# Patient Record
Sex: MALE | Race: WHITE | NOT HISPANIC OR LATINO | Employment: OTHER | RURAL
[De-identification: names, ages, dates, MRNs, and addresses within clinical notes are randomized per-mention and may not be internally consistent; named-entity substitution may affect disease eponyms.]

---

## 2017-02-28 ENCOUNTER — CONVERSION ENCOUNTER (OUTPATIENT)
Dept: FAMILY MEDICINE CLINIC | Facility: CLINIC | Age: 42
End: 2017-02-28

## 2017-02-28 LAB
ALBUMIN SERPL-MCNC: 4.5 G/DL (ref 3.6–5.1)
ALBUMIN/GLOB SERPL: ABNORMAL {RATIO} (ref 1–2.5)
ALP SERPL-CCNC: 42 UNITS/L (ref 40–115)
ALT SERPL-CCNC: 69 UNITS/L (ref 9–46)
AST SERPL-CCNC: 28 UNITS/L (ref 10–40)
BASOPHILS # BLD AUTO: ABNORMAL 10*3/MM3 (ref 0–200)
BASOPHILS NFR BLD AUTO: 0.2 %
BILIRUB SERPL-MCNC: 0.4 MG/DL (ref 0.2–1.2)
BUN SERPL-MCNC: 14 MG/DL (ref 7–25)
BUN/CREAT SERPL: ABNORMAL (ref 6–22)
CALCIUM SERPL-MCNC: 10.1 MG/DL (ref 8.6–10.3)
CHLORIDE SERPL-SCNC: 104 MMOL/L (ref 98–110)
CO2 CONTENT VENOUS: 29 MMOL/L (ref 20–31)
CONV NEUTROPHILS/100 LEUKOCYTES IN BODY FLUID BY MANUAL COUNT: 59.7 %
CONV TOTAL PROTEIN: 7 G/DL (ref 6.1–8.1)
CREAT UR-MCNC: 0.9 MG/DL (ref 0.6–1.35)
EOSINOPHIL # BLD AUTO: 2 %
EOSINOPHIL # BLD AUTO: ABNORMAL 10*3/MM3 (ref 15–500)
ERYTHROCYTE [DISTWIDTH] IN BLOOD BY AUTOMATED COUNT: 15 % (ref 11–15)
GLOBULIN UR ELPH-MCNC: ABNORMAL G/DL (ref 1.9–3.7)
GLUCOSE SERPL-MCNC: 84 MG/DL (ref 65–99)
HCT VFR BLD AUTO: 55.2 % (ref 38.5–50)
HGB BLD-MCNC: 17.9 G/DL (ref 13.2–17.1)
LYMPHOCYTES # BLD AUTO: ABNORMAL 10*3/MM3 (ref 850–3900)
LYMPHOCYTES NFR BLD AUTO: 28.7 %
MCH RBC QN AUTO: 28.9 PG (ref 27–33)
MCHC RBC AUTO-ENTMCNC: ABNORMAL % (ref 32–36)
MCV RBC AUTO: 89.1 FL (ref 80–100)
MONOCYTES # BLD AUTO: ABNORMAL 10*3/MICROLITER (ref 200–950)
MONOCYTES NFR BLD AUTO: 9.4 %
NEUTROPHILS # BLD AUTO: ABNORMAL 10*3/MM3 (ref 1500–7800)
PLATELET # BLD AUTO: ABNORMAL 10*3/MM3 (ref 140–400)
PMV BLD AUTO: 7.5 FL (ref 7.5–12.5)
POTASSIUM SERPL-SCNC: 4.3 MMOL/L (ref 3.5–5.3)
RBC # BLD AUTO: ABNORMAL 10*6/MM3 (ref 4.2–5.8)
SODIUM SERPL-SCNC: 142 MMOL/L (ref 135–146)
TSH SERPL-ACNC: 1.3 MICROINTL UNITS/ML (ref 0.4–4.5)
WBC # BLD AUTO: ABNORMAL K/UL (ref 3.8–10.8)

## 2018-03-23 LAB
ALBUMIN SERPL-MCNC: 4.4 G/DL (ref 3.6–5.1)
ALBUMIN/GLOB SERPL: ABNORMAL {RATIO} (ref 1–2.5)
ALP SERPL-CCNC: 40 UNITS/L (ref 40–115)
ALT SERPL-CCNC: 42 UNITS/L (ref 9–46)
AST SERPL-CCNC: 22 UNITS/L (ref 10–40)
BASOPHILS # BLD AUTO: ABNORMAL 10*3/MM3 (ref 0–200)
BASOPHILS NFR BLD AUTO: 1 %
BILIRUB SERPL-MCNC: 0.6 MG/DL (ref 0.2–1.2)
BUN SERPL-MCNC: 15 MG/DL (ref 7–25)
BUN/CREAT SERPL: ABNORMAL (ref 6–22)
CALCIUM SERPL-MCNC: 9.9 MG/DL (ref 8.6–10.3)
CHLORIDE SERPL-SCNC: 102 MMOL/L (ref 98–110)
CHOLEST SERPL-MCNC: 262 MG/DL
CHOLEST/HDLC SERPL: ABNORMAL {RATIO}
CO2 CONTENT VENOUS: 32 MMOL/L (ref 20–31)
CONV NEUTROPHILS/100 LEUKOCYTES IN BODY FLUID BY MANUAL COUNT: 53.5 %
CONV TOTAL PROTEIN: 6.9 G/DL (ref 6.1–8.1)
CREAT UR-MCNC: 1 MG/DL (ref 0.6–1.35)
EOSINOPHIL # BLD AUTO: 2.6 %
EOSINOPHIL # BLD AUTO: ABNORMAL 10*3/MM3 (ref 15–500)
ERYTHROCYTE [DISTWIDTH] IN BLOOD BY AUTOMATED COUNT: 13 % (ref 11–15)
GLOBULIN UR ELPH-MCNC: ABNORMAL G/DL (ref 1.9–3.7)
GLUCOSE SERPL-MCNC: 96 MG/DL (ref 65–99)
HCT VFR BLD AUTO: 51.1 % (ref 38.5–50)
HDLC SERPL-MCNC: 47 MG/DL
HGB BLD-MCNC: 17.4 G/DL (ref 13.2–17.1)
LDLC SERPL CALC-MCNC: ABNORMAL MG/DL
LYMPHOCYTES # BLD AUTO: ABNORMAL 10*3/MM3 (ref 850–3900)
LYMPHOCYTES NFR BLD AUTO: 34.7 %
MCH RBC QN AUTO: 29.3 PG (ref 27–33)
MCHC RBC AUTO-ENTMCNC: ABNORMAL % (ref 32–36)
MCV RBC AUTO: 86.2 FL (ref 80–100)
MONOCYTES # BLD AUTO: ABNORMAL 10*3/MICROLITER (ref 200–950)
MONOCYTES NFR BLD AUTO: 8.2 %
NEUTROPHILS # BLD AUTO: ABNORMAL 10*3/MM3 (ref 1500–7800)
PLATELET # BLD AUTO: ABNORMAL 10*3/MM3 (ref 140–400)
PMV BLD AUTO: 9 FL (ref 7.5–12.5)
POTASSIUM SERPL-SCNC: 4.5 MMOL/L (ref 3.5–5.3)
RBC # BLD AUTO: ABNORMAL 10*6/MM3 (ref 4.2–5.8)
SODIUM SERPL-SCNC: 140 MMOL/L (ref 135–146)
TRIGL SERPL-MCNC: 168 MG/DL
WBC # BLD AUTO: ABNORMAL K/UL (ref 3.8–10.8)

## 2019-05-08 LAB
ALBUMIN SERPL-MCNC: 4.2 G/DL (ref 3.6–5.1)
ALBUMIN/GLOB SERPL: ABNORMAL {RATIO} (ref 1–2.5)
ALP SERPL-CCNC: 45 UNITS/L (ref 40–115)
ALT SERPL-CCNC: 77 UNITS/L (ref 9–46)
AST SERPL-CCNC: 45 UNITS/L (ref 10–40)
BILIRUB SERPL-MCNC: 0.7 MG/DL (ref 0.2–1.2)
BUN SERPL-MCNC: 14 MG/DL (ref 7–25)
BUN/CREAT SERPL: ABNORMAL (ref 6–22)
CALCIUM SERPL-MCNC: 9.4 MG/DL (ref 8.6–10.3)
CHLORIDE SERPL-SCNC: 103 MMOL/L (ref 98–110)
CHOLEST SERPL-MCNC: 170 MG/DL
CHOLEST/HDLC SERPL: ABNORMAL {RATIO}
CO2 CONTENT VENOUS: 27 MMOL/L (ref 20–32)
CONV TOTAL PROTEIN: 6.7 G/DL (ref 6.1–8.1)
CREAT UR-MCNC: 0.91 MG/DL (ref 0.6–1.35)
ERYTHROCYTE [DISTWIDTH] IN BLOOD BY AUTOMATED COUNT: 13.7 % (ref 11–15)
GLOBULIN UR ELPH-MCNC: ABNORMAL G/DL (ref 1.9–3.7)
GLUCOSE SERPL-MCNC: 102 MG/DL (ref 65–99)
HCT VFR BLD AUTO: 48.9 % (ref 38.5–50)
HDLC SERPL-MCNC: 52 MG/DL
HGB BLD-MCNC: 16.6 G/DL (ref 13.2–17.1)
LDLC SERPL CALC-MCNC: ABNORMAL MG/DL
MCH RBC QN AUTO: 29.5 PG (ref 27–33)
MCHC RBC AUTO-ENTMCNC: ABNORMAL % (ref 32–36)
MCV RBC AUTO: 87 FL (ref 80–100)
PLATELET # BLD AUTO: ABNORMAL 10*3/MM3 (ref 140–400)
PMV BLD AUTO: 8.6 FL (ref 7.5–12.5)
POTASSIUM SERPL-SCNC: 4 MMOL/L (ref 3.5–5.3)
RBC # BLD AUTO: ABNORMAL 10*6/MM3 (ref 4.2–5.8)
SODIUM SERPL-SCNC: 140 MMOL/L (ref 135–146)
TRIGL SERPL-MCNC: 119 MG/DL
WBC # BLD AUTO: ABNORMAL K/UL (ref 3.8–10.8)

## 2019-07-26 RX ORDER — ATORVASTATIN CALCIUM 40 MG/1
TABLET, FILM COATED ORAL
Qty: 90 TABLET | Refills: 1 | Status: SHIPPED | OUTPATIENT
Start: 2019-07-26 | End: 2020-06-03

## 2019-08-28 RX ORDER — LISINOPRIL AND HYDROCHLOROTHIAZIDE 20; 12.5 MG/1; MG/1
TABLET ORAL
Qty: 60 TABLET | Refills: 2 | Status: SHIPPED | OUTPATIENT
Start: 2019-08-28 | End: 2020-01-13

## 2019-10-06 ENCOUNTER — TELEPHONE (OUTPATIENT)
Dept: FAMILY MEDICINE CLINIC | Facility: CLINIC | Age: 44
End: 2019-10-06

## 2019-10-06 RX ORDER — ACYCLOVIR 400 MG/1
400 TABLET ORAL
Qty: 50 TABLET | Refills: 0 | Status: SHIPPED | OUTPATIENT
Start: 2019-10-06 | End: 2020-01-13

## 2019-10-06 RX ORDER — HYDROCODONE BITARTRATE AND ACETAMINOPHEN 5; 325 MG/1; MG/1
1 TABLET ORAL EVERY 6 HOURS PRN
Qty: 25 TABLET | Refills: 0 | Status: SHIPPED | OUTPATIENT
Start: 2019-10-06 | End: 2020-01-13

## 2019-10-06 NOTE — TELEPHONE ENCOUNTER
Patient called with painful rash on left side of forehead. Suspicious for shingles (photo sent). Meds sent to pharmacy. Instructions given.

## 2019-12-30 ENCOUNTER — OFFICE VISIT (OUTPATIENT)
Dept: FAMILY MEDICINE CLINIC | Facility: CLINIC | Age: 44
End: 2019-12-30

## 2019-12-30 DIAGNOSIS — J11.1 INFLUENZA: Primary | ICD-10-CM

## 2019-12-30 PROBLEM — E78.5 HYPERLIPIDEMIA: Status: ACTIVE | Noted: 2017-08-23

## 2019-12-30 PROBLEM — F98.8 ATTENTION DEFICIT DISORDER (ADD) WITHOUT HYPERACTIVITY: Status: ACTIVE | Noted: 2019-12-30

## 2019-12-30 PROCEDURE — 99213 OFFICE O/P EST LOW 20 MIN: CPT | Performed by: FAMILY MEDICINE

## 2019-12-30 RX ORDER — AMLODIPINE BESYLATE 10 MG/1
10 TABLET ORAL DAILY
COMMUNITY
Start: 2017-10-09 | End: 2020-03-24

## 2019-12-30 RX ORDER — FEXOFENADINE HYDROCHLORIDE 60 MG/1
TABLET, FILM COATED ORAL EVERY 24 HOURS
COMMUNITY
Start: 2019-05-09 | End: 2020-01-13

## 2019-12-30 RX ORDER — BUPROPION HYDROCHLORIDE 150 MG/1
TABLET, EXTENDED RELEASE ORAL EVERY 8 HOURS
COMMUNITY
Start: 2018-02-06 | End: 2020-01-13

## 2019-12-30 RX ORDER — TRAZODONE HYDROCHLORIDE 50 MG/1
TABLET ORAL
COMMUNITY
Start: 2019-12-27 | End: 2020-01-13

## 2019-12-30 RX ORDER — ASPIRIN 81 MG/1
81 TABLET ORAL DAILY
COMMUNITY
Start: 2019-05-09 | End: 2021-06-14 | Stop reason: SDUPTHER

## 2020-01-02 VITALS
TEMPERATURE: 98.7 F | RESPIRATION RATE: 16 BRPM | WEIGHT: 305 LBS | OXYGEN SATURATION: 94 % | DIASTOLIC BLOOD PRESSURE: 86 MMHG | SYSTOLIC BLOOD PRESSURE: 138 MMHG | HEIGHT: 71 IN | BODY MASS INDEX: 42.7 KG/M2 | HEART RATE: 88 BPM

## 2020-01-02 NOTE — ASSESSMENT & PLAN NOTE
Given the current community influenza outbreak, his classic symptoms, and timing I suspect he has influenza, most likely influenza A.  Discussed contagious nature and will provide prophylaxis for his spouse.  Cough medications as below. Increase fluids. Tylenol and Advil prn. Report worsening or persistence of symptoms. Discussed medications to help with symptoms of upper respiratory infection including cough suppressants, decongestants, anti-inflammatory medications, antihistamines, and antipyretics.  Patient was given a list of medications with appropriate dosages.

## 2020-01-13 ENCOUNTER — OFFICE VISIT (OUTPATIENT)
Dept: FAMILY MEDICINE CLINIC | Facility: CLINIC | Age: 45
End: 2020-01-13

## 2020-01-13 ENCOUNTER — HOSPITAL ENCOUNTER (OUTPATIENT)
Facility: HOSPITAL | Age: 45
Discharge: HOME | End: 2020-01-14
Attending: INTERNAL MEDICINE | Admitting: INTERNAL MEDICINE

## 2020-01-13 ENCOUNTER — APPOINTMENT (OUTPATIENT)
Dept: GENERAL RADIOLOGY | Facility: HOSPITAL | Age: 45
End: 2020-01-13

## 2020-01-13 VITALS
RESPIRATION RATE: 18 BRPM | HEART RATE: 86 BPM | DIASTOLIC BLOOD PRESSURE: 75 MMHG | TEMPERATURE: 97.9 F | OXYGEN SATURATION: 95 % | BODY MASS INDEX: 43.26 KG/M2 | HEIGHT: 71 IN | SYSTOLIC BLOOD PRESSURE: 121 MMHG | WEIGHT: 309 LBS

## 2020-01-13 DIAGNOSIS — I47.20 VT (VENTRICULAR TACHYCARDIA) (HCC): ICD-10-CM

## 2020-01-13 DIAGNOSIS — R07.9 CHEST PAIN, UNSPECIFIED TYPE: Primary | ICD-10-CM

## 2020-01-13 PROBLEM — J11.1 INFLUENZA: Status: RESOLVED | Noted: 2019-12-30 | Resolved: 2020-01-13

## 2020-01-13 PROBLEM — I10 HYPERTENSION: Status: ACTIVE | Noted: 2020-01-13

## 2020-01-13 LAB
ALBUMIN SERPL-MCNC: 4 G/DL (ref 3.5–5.2)
ALBUMIN/GLOB SERPL: 1.3 G/DL
ALP SERPL-CCNC: 45 U/L (ref 39–117)
ALT SERPL W P-5'-P-CCNC: 77 U/L (ref 1–41)
ANION GAP SERPL CALCULATED.3IONS-SCNC: 12 MMOL/L (ref 5–15)
AST SERPL-CCNC: 43 U/L (ref 1–40)
BASOPHILS # BLD AUTO: 0 10*3/MM3 (ref 0–0.2)
BASOPHILS NFR BLD AUTO: 0.2 % (ref 0–1.5)
BILIRUB SERPL-MCNC: 0.3 MG/DL (ref 0.2–1.2)
BUN BLD-MCNC: 14 MG/DL (ref 6–20)
BUN/CREAT SERPL: 17.1 (ref 7–25)
CALCIUM SPEC-SCNC: 9.5 MG/DL (ref 8.6–10.5)
CHLORIDE SERPL-SCNC: 104 MMOL/L (ref 98–107)
CO2 SERPL-SCNC: 23 MMOL/L (ref 22–29)
CREAT BLD-MCNC: 0.82 MG/DL (ref 0.76–1.27)
DEPRECATED RDW RBC AUTO: 45.5 FL (ref 37–54)
EOSINOPHIL # BLD AUTO: 0.3 10*3/MM3 (ref 0–0.4)
EOSINOPHIL NFR BLD AUTO: 3.3 % (ref 0.3–6.2)
ERYTHROCYTE [DISTWIDTH] IN BLOOD BY AUTOMATED COUNT: 14.4 % (ref 12.3–15.4)
GFR SERPL CREATININE-BSD FRML MDRD: 102 ML/MIN/1.73
GLOBULIN UR ELPH-MCNC: 3.2 GM/DL
GLUCOSE BLD-MCNC: 129 MG/DL (ref 65–99)
HCT VFR BLD AUTO: 50.2 % (ref 37.5–51)
HGB BLD-MCNC: 16.9 G/DL (ref 13–17.7)
HOLD SPECIMEN: NORMAL
HOLD SPECIMEN: NORMAL
LYMPHOCYTES # BLD AUTO: 2.2 10*3/MM3 (ref 0.7–3.1)
LYMPHOCYTES NFR BLD AUTO: 24.6 % (ref 19.6–45.3)
MCH RBC QN AUTO: 30.4 PG (ref 26.6–33)
MCHC RBC AUTO-ENTMCNC: 33.6 G/DL (ref 31.5–35.7)
MCV RBC AUTO: 90.4 FL (ref 79–97)
MONOCYTES # BLD AUTO: 0.8 10*3/MM3 (ref 0.1–0.9)
MONOCYTES NFR BLD AUTO: 8.9 % (ref 5–12)
NEUTROPHILS # BLD AUTO: 5.8 10*3/MM3 (ref 1.7–7)
NEUTROPHILS NFR BLD AUTO: 63 % (ref 42.7–76)
NRBC BLD AUTO-RTO: 0.1 /100 WBC (ref 0–0.2)
PLATELET # BLD AUTO: 295 10*3/MM3 (ref 140–450)
PMV BLD AUTO: 6.7 FL (ref 6–12)
POTASSIUM BLD-SCNC: 3.9 MMOL/L (ref 3.5–5.2)
PROT SERPL-MCNC: 7.2 G/DL (ref 6–8.5)
RBC # BLD AUTO: 5.55 10*6/MM3 (ref 4.14–5.8)
SODIUM BLD-SCNC: 139 MMOL/L (ref 136–145)
TROPONIN T SERPL-MCNC: <0.01 NG/ML (ref 0–0.03)
WBC NRBC COR # BLD: 9.1 10*3/MM3 (ref 3.4–10.8)
WHOLE BLOOD HOLD SPECIMEN: NORMAL
WHOLE BLOOD HOLD SPECIMEN: NORMAL

## 2020-01-13 PROCEDURE — G0378 HOSPITAL OBSERVATION PER HR: HCPCS

## 2020-01-13 PROCEDURE — 85025 COMPLETE CBC W/AUTO DIFF WBC: CPT | Performed by: NURSE PRACTITIONER

## 2020-01-13 PROCEDURE — 99213 OFFICE O/P EST LOW 20 MIN: CPT | Performed by: FAMILY MEDICINE

## 2020-01-13 PROCEDURE — 36415 COLL VENOUS BLD VENIPUNCTURE: CPT

## 2020-01-13 PROCEDURE — 99284 EMERGENCY DEPT VISIT MOD MDM: CPT

## 2020-01-13 PROCEDURE — 93005 ELECTROCARDIOGRAM TRACING: CPT | Performed by: INTERNAL MEDICINE

## 2020-01-13 PROCEDURE — 80053 COMPREHEN METABOLIC PANEL: CPT | Performed by: NURSE PRACTITIONER

## 2020-01-13 PROCEDURE — 93000 ELECTROCARDIOGRAM COMPLETE: CPT | Performed by: FAMILY MEDICINE

## 2020-01-13 PROCEDURE — 93005 ELECTROCARDIOGRAM TRACING: CPT

## 2020-01-13 PROCEDURE — 71045 X-RAY EXAM CHEST 1 VIEW: CPT

## 2020-01-13 PROCEDURE — 84484 ASSAY OF TROPONIN QUANT: CPT | Performed by: NURSE PRACTITIONER

## 2020-01-13 PROCEDURE — 99219 PR INITIAL OBSERVATION CARE/DAY 50 MINUTES: CPT | Performed by: PHYSICIAN ASSISTANT

## 2020-01-13 RX ORDER — NITROGLYCERIN 0.4 MG/1
0.4 TABLET SUBLINGUAL
Status: DISCONTINUED | OUTPATIENT
Start: 2020-01-13 | End: 2020-01-14 | Stop reason: HOSPADM

## 2020-01-13 RX ORDER — ONDANSETRON 2 MG/ML
4 INJECTION INTRAMUSCULAR; INTRAVENOUS EVERY 6 HOURS PRN
Status: DISCONTINUED | OUTPATIENT
Start: 2020-01-13 | End: 2020-01-14 | Stop reason: HOSPADM

## 2020-01-13 RX ORDER — KETOROLAC TROMETHAMINE 15 MG/ML
15 INJECTION, SOLUTION INTRAMUSCULAR; INTRAVENOUS EVERY 6 HOURS PRN
Status: DISCONTINUED | OUTPATIENT
Start: 2020-01-13 | End: 2020-01-14 | Stop reason: HOSPADM

## 2020-01-13 RX ORDER — ASPIRIN 81 MG/1
324 TABLET, CHEWABLE ORAL ONCE
Status: COMPLETED | OUTPATIENT
Start: 2020-01-13 | End: 2020-01-13

## 2020-01-13 RX ORDER — LISINOPRIL AND HYDROCHLOROTHIAZIDE 20; 12.5 MG/1; MG/1
2 TABLET ORAL DAILY
COMMUNITY
End: 2020-02-04

## 2020-01-13 RX ORDER — HYDRALAZINE HYDROCHLORIDE 20 MG/ML
10 INJECTION INTRAMUSCULAR; INTRAVENOUS EVERY 6 HOURS PRN
Status: DISCONTINUED | OUTPATIENT
Start: 2020-01-13 | End: 2020-01-14 | Stop reason: HOSPADM

## 2020-01-13 RX ORDER — POTASSIUM CHLORIDE 20 MEQ/1
40 TABLET, EXTENDED RELEASE ORAL AS NEEDED
Status: DISCONTINUED | OUTPATIENT
Start: 2020-01-13 | End: 2020-01-14 | Stop reason: HOSPADM

## 2020-01-13 RX ORDER — CHOLECALCIFEROL (VITAMIN D3) 125 MCG
5 CAPSULE ORAL NIGHTLY PRN
Status: DISCONTINUED | OUTPATIENT
Start: 2020-01-13 | End: 2020-01-14 | Stop reason: HOSPADM

## 2020-01-13 RX ORDER — BISACODYL 10 MG
10 SUPPOSITORY, RECTAL RECTAL DAILY PRN
Status: DISCONTINUED | OUTPATIENT
Start: 2020-01-13 | End: 2020-01-14 | Stop reason: HOSPADM

## 2020-01-13 RX ORDER — SODIUM CHLORIDE 0.9 % (FLUSH) 0.9 %
10 SYRINGE (ML) INJECTION AS NEEDED
Status: DISCONTINUED | OUTPATIENT
Start: 2020-01-13 | End: 2020-01-14 | Stop reason: HOSPADM

## 2020-01-13 RX ORDER — POTASSIUM CHLORIDE 1.5 G/1.77G
40 POWDER, FOR SOLUTION ORAL AS NEEDED
Status: DISCONTINUED | OUTPATIENT
Start: 2020-01-13 | End: 2020-01-14 | Stop reason: HOSPADM

## 2020-01-13 RX ORDER — ALUMINA, MAGNESIA, AND SIMETHICONE 2400; 2400; 240 MG/30ML; MG/30ML; MG/30ML
15 SUSPENSION ORAL EVERY 6 HOURS PRN
Status: DISCONTINUED | OUTPATIENT
Start: 2020-01-13 | End: 2020-01-14 | Stop reason: HOSPADM

## 2020-01-13 RX ORDER — CALCIUM CARBONATE 200(500)MG
2 TABLET,CHEWABLE ORAL 2 TIMES DAILY PRN
Status: DISCONTINUED | OUTPATIENT
Start: 2020-01-13 | End: 2020-01-14 | Stop reason: HOSPADM

## 2020-01-13 RX ORDER — ATORVASTATIN CALCIUM 40 MG/1
40 TABLET, FILM COATED ORAL NIGHTLY
Status: DISCONTINUED | OUTPATIENT
Start: 2020-01-13 | End: 2020-01-14 | Stop reason: HOSPADM

## 2020-01-13 RX ORDER — ACETAMINOPHEN 325 MG/1
650 TABLET ORAL EVERY 4 HOURS PRN
Status: DISCONTINUED | OUTPATIENT
Start: 2020-01-13 | End: 2020-01-14 | Stop reason: HOSPADM

## 2020-01-13 RX ORDER — MAGNESIUM SULFATE HEPTAHYDRATE 40 MG/ML
2 INJECTION, SOLUTION INTRAVENOUS AS NEEDED
Status: DISCONTINUED | OUTPATIENT
Start: 2020-01-13 | End: 2020-01-14 | Stop reason: HOSPADM

## 2020-01-13 RX ORDER — SODIUM CHLORIDE 0.9 % (FLUSH) 0.9 %
10 SYRINGE (ML) INJECTION EVERY 12 HOURS SCHEDULED
Status: DISCONTINUED | OUTPATIENT
Start: 2020-01-13 | End: 2020-01-14 | Stop reason: HOSPADM

## 2020-01-13 RX ORDER — DOCUSATE SODIUM 100 MG/1
100 CAPSULE, LIQUID FILLED ORAL 2 TIMES DAILY PRN
Status: DISCONTINUED | OUTPATIENT
Start: 2020-01-13 | End: 2020-01-14 | Stop reason: HOSPADM

## 2020-01-13 RX ORDER — ASPIRIN 81 MG/1
81 TABLET ORAL DAILY
Status: DISCONTINUED | OUTPATIENT
Start: 2020-01-14 | End: 2020-01-14 | Stop reason: HOSPADM

## 2020-01-13 RX ORDER — MAGNESIUM SULFATE HEPTAHYDRATE 40 MG/ML
4 INJECTION, SOLUTION INTRAVENOUS AS NEEDED
Status: DISCONTINUED | OUTPATIENT
Start: 2020-01-13 | End: 2020-01-14 | Stop reason: HOSPADM

## 2020-01-13 RX ORDER — ACETAMINOPHEN 650 MG/1
650 SUPPOSITORY RECTAL EVERY 4 HOURS PRN
Status: DISCONTINUED | OUTPATIENT
Start: 2020-01-13 | End: 2020-01-14 | Stop reason: HOSPADM

## 2020-01-13 RX ORDER — ONDANSETRON 4 MG/1
4 TABLET, FILM COATED ORAL EVERY 6 HOURS PRN
Status: DISCONTINUED | OUTPATIENT
Start: 2020-01-13 | End: 2020-01-14 | Stop reason: HOSPADM

## 2020-01-13 RX ORDER — HYDRALAZINE HYDROCHLORIDE 10 MG/1
10 TABLET, FILM COATED ORAL ONCE
Status: DISCONTINUED | OUTPATIENT
Start: 2020-01-13 | End: 2020-01-13

## 2020-01-13 RX ORDER — ACETAMINOPHEN 160 MG/5ML
650 SOLUTION ORAL EVERY 4 HOURS PRN
Status: DISCONTINUED | OUTPATIENT
Start: 2020-01-13 | End: 2020-01-14 | Stop reason: HOSPADM

## 2020-01-13 RX ADMIN — ATORVASTATIN CALCIUM 40 MG: 40 TABLET, FILM COATED ORAL at 22:16

## 2020-01-13 RX ADMIN — Medication 10 ML: at 22:17

## 2020-01-13 RX ADMIN — ACETAMINOPHEN 650 MG: 325 TABLET, FILM COATED ORAL at 23:28

## 2020-01-13 RX ADMIN — ASPIRIN 81 MG 324 MG: 81 TABLET ORAL at 18:11

## 2020-01-13 RX ADMIN — NITROGLYCERIN 1 INCH: 20 OINTMENT TOPICAL at 19:18

## 2020-01-13 NOTE — ASSESSMENT & PLAN NOTE
EKG shows some nonspecific changes with some T wave inversion in inferior lateral leads and possible ST depression.  Could be subacute coronary syndrome but also could be myocarditis.  Patient's advised to go to the emergency room for further work-up including blood work and monitoring.  Discussed with emergency room.  EKG scanned into chart.

## 2020-01-13 NOTE — PROGRESS NOTES
Patient presents with a 4-day history of pleuritic type chest pain that is sharp and associated with movement.  He had recent upper respiratory infection from influenza virus.  He has not had any change in exercise tolerance.  Risk factors include hypertension, hyperlipidemia, and obesity.  He had contacted me over the weekend and been advised to go the emergency room for pain but chose to come to the office today.  He denies sputum production but has had a nonproductive cough for several weeks.    Past Medical History:   Diagnosis Date   • ADD (attention deficit disorder)    • Allergic rhinitis    • Anserine bursitis l   • Bilateral knee pain    • Borderline hyperlipidemia    • BPH (benign prostatic hyperplasia)    • Condition not found    • Depression    • Fatigue    • History of tobacco use    • Hypertension    • Left shoulder strain    • Malaise and fatigue    • Obesity    • Sore throat      Past Surgical History:   Procedure Laterality Date   • ANKLE ARTHROSCOPY Right 11/2008   • TONSILLECTOMY       Family History   Problem Relation Age of Onset   • Diabetes Father    • Hypertension Father    • Hyperlipidemia Father    • Other Father         lung/respiratory disease    • Stroke Father    • Diabetes Maternal Grandmother    • Lung cancer Maternal Grandfather    • Heart disease Paternal Grandfather      Social History     Tobacco Use   • Smoking status: Never Smoker   • Smokeless tobacco: Never Used   Substance Use Topics   • Alcohol use: Not on file     Current Outpatient Medications on File Prior to Visit   Medication Sig Dispense Refill   • acyclovir (ZOVIRAX) 400 MG tablet Take 1 tablet by mouth 5 (Five) Times a Day. Take no more than 5 doses a day. 50 tablet 0   • amLODIPine (NORVASC) 10 MG tablet Daily.     • aspirin (ADULT ASPIRIN REGIMEN) 81 MG EC tablet Daily.     • atorvastatin (LIPITOR) 40 MG tablet TAKE ONE TABLET BY MOUTH ONCE DAILY 90 tablet 1   • buPROPion SR (WELLBUTRIN SR) 150 MG 12 hr tablet  "Every 8 (Eight) Hours.     • fexofenadine (ALLEGRA ALLERGY) 60 MG tablet Daily.     • lisinopril-hydrochlorothiazide (PRINZIDE,ZESTORETIC) 20-12.5 MG per tablet TAKE ONE TABLET BY MOUTH TWO TIMES A DAY 60 tablet 2   • traZODone (DESYREL) 50 MG tablet      • HYDROcodone-acetaminophen (NORCO) 5-325 MG per tablet Take 1 tablet by mouth Every 6 (Six) Hours As Needed for Severe Pain . 25 tablet 0   • HYDROcodone-homatropine (HYCODAN) 5-1.5 MG/5ML syrup Take 5 mL by mouth Every 6 (Six) Hours As Needed for Cough. 240 mL 0     No current facility-administered medications on file prior to visit.       PHQ-2 Depression Screening  Little interest or pleasure in doing things? 0   Feeling down, depressed, or hopeless? 0   PHQ-2 Total Score 0     Review of Systems   Constitutional: Negative for chills and fatigue.   Respiratory: Positive for cough. Negative for shortness of breath.    Cardiovascular: Positive for chest pain. Negative for palpitations.   Gastrointestinal: Negative for nausea.   Musculoskeletal: Negative for arthralgias, back pain and myalgias.   Skin: Negative for rash.   Neurological: Negative for dizziness and headache.     Vitals:    01/13/20 1649   BP: 121/75   BP Location: Left arm   Patient Position: Sitting   Cuff Size: Adult   Pulse: 86   Resp: 18   Temp: 97.9 °F (36.6 °C)   TempSrc: Oral   SpO2: 95%   Weight: (!) 140 kg (309 lb)   Height: 180.3 cm (71\")     Body mass index is 43.1 kg/m².    Physical Exam   Constitutional: He is oriented to person, place, and time. He appears well-developed and well-nourished. No distress.   Cardiovascular: Normal rate, regular rhythm and normal heart sounds.   No murmur heard.  Pulmonary/Chest: Effort normal and breath sounds normal. He has no wheezes. He has no rales.   Abdominal: Soft. Bowel sounds are normal. He exhibits no distension.   Musculoskeletal: Normal range of motion.   Neurological: He is alert and oriented to person, place, and time.   Skin: Skin is warm " and dry.   Psychiatric: He has a normal mood and affect. His behavior is normal.           Diagnoses and all orders for this visit:    1. Chest pain, unspecified type (Primary)  Assessment & Plan:  EKG shows some nonspecific changes with some T wave inversion in inferior lateral leads and possible ST depression.  Could be subacute coronary syndrome but also could be myocarditis.  Patient's advised to go to the emergency room for further work-up including blood work and monitoring.  Discussed with emergency room.  EKG scanned into chart.    Orders:  -     ECG 12 Lead  SCANNED EKG  Date/Time: 1/13/2020 5:09 PM  Performed by: Lyell, Reggie Duane, MD  Authorized by: Lyell, Reggie Duane, MD       ECG 12 Lead  Date/Time: 1/13/2020 5:10 PM  Performed by: Lyell, Reggie Duane, MD  Authorized by: Lyell, Reggie Duane, MD   Comparison: not compared with previous ECG   Previous ECG: no previous ECG available  Rhythm: sinus rhythm  Rate: normal  Conduction: conduction normal  ST Depression: II, III, aVF, V4, V5 and V6  T inversion: II, III, aVF, V4, V5, V6 and V3  T flattening: aVR and I  QRS axis: normal  Other findings: non-specific ST-T wave changes    Clinical impression: abnormal EKG

## 2020-01-14 ENCOUNTER — APPOINTMENT (OUTPATIENT)
Dept: NUCLEAR MEDICINE | Facility: HOSPITAL | Age: 45
End: 2020-01-14

## 2020-01-14 ENCOUNTER — APPOINTMENT (OUTPATIENT)
Dept: RESPIRATORY THERAPY | Facility: HOSPITAL | Age: 45
End: 2020-01-14

## 2020-01-14 VITALS
RESPIRATION RATE: 20 BRPM | OXYGEN SATURATION: 93 % | DIASTOLIC BLOOD PRESSURE: 83 MMHG | HEART RATE: 95 BPM | SYSTOLIC BLOOD PRESSURE: 138 MMHG | WEIGHT: 300 LBS | HEIGHT: 71 IN | TEMPERATURE: 99 F | BODY MASS INDEX: 42 KG/M2

## 2020-01-14 PROBLEM — I47.20 VT (VENTRICULAR TACHYCARDIA) (HCC): Status: ACTIVE | Noted: 2020-01-13

## 2020-01-14 LAB
ANION GAP SERPL CALCULATED.3IONS-SCNC: 9 MMOL/L (ref 5–15)
BASOPHILS # BLD AUTO: 0.1 10*3/MM3 (ref 0–0.2)
BASOPHILS NFR BLD AUTO: 1.2 % (ref 0–1.5)
BH CV NUCLEAR PRIOR STUDY: 3
BH CV STRESS BP STAGE 1: NORMAL
BH CV STRESS BP STAGE 2: NORMAL
BH CV STRESS BP STAGE 3: NORMAL
BH CV STRESS DURATION MIN STAGE 1: 3
BH CV STRESS DURATION MIN STAGE 2: 3
BH CV STRESS DURATION MIN STAGE 3: 3
BH CV STRESS DURATION SEC STAGE 1: 0
BH CV STRESS DURATION SEC STAGE 2: 0
BH CV STRESS DURATION SEC STAGE 3: 0
BH CV STRESS GRADE STAGE 1: 10
BH CV STRESS GRADE STAGE 2: 12
BH CV STRESS GRADE STAGE 3: 14
BH CV STRESS HR STAGE 1: 114
BH CV STRESS HR STAGE 2: 139
BH CV STRESS HR STAGE 3: 152
BH CV STRESS METS STAGE 1: 5
BH CV STRESS METS STAGE 2: 7.5
BH CV STRESS METS STAGE 3: 10
BH CV STRESS PROTOCOL 1: NORMAL
BH CV STRESS RECOVERY BP: NORMAL MMHG
BH CV STRESS RECOVERY HR: 104 BPM
BH CV STRESS SPEED STAGE 1: 1.7
BH CV STRESS SPEED STAGE 2: 2.5
BH CV STRESS SPEED STAGE 3: 3.4
BH CV STRESS STAGE 1: 1
BH CV STRESS STAGE 2: 2
BH CV STRESS STAGE 3: 3
BUN BLD-MCNC: 17 MG/DL (ref 6–20)
BUN/CREAT SERPL: 19.1 (ref 7–25)
CALCIUM SPEC-SCNC: 9.5 MG/DL (ref 8.6–10.5)
CHLORIDE SERPL-SCNC: 103 MMOL/L (ref 98–107)
CO2 SERPL-SCNC: 26 MMOL/L (ref 22–29)
CREAT BLD-MCNC: 0.89 MG/DL (ref 0.76–1.27)
DEPRECATED RDW RBC AUTO: 44.6 FL (ref 37–54)
EOSINOPHIL # BLD AUTO: 0.3 10*3/MM3 (ref 0–0.4)
EOSINOPHIL NFR BLD AUTO: 3.5 % (ref 0.3–6.2)
ERYTHROCYTE [DISTWIDTH] IN BLOOD BY AUTOMATED COUNT: 14.1 % (ref 12.3–15.4)
GFR SERPL CREATININE-BSD FRML MDRD: 93 ML/MIN/1.73
GLUCOSE BLD-MCNC: 97 MG/DL (ref 65–99)
HCT VFR BLD AUTO: 46.5 % (ref 37.5–51)
HGB BLD-MCNC: 15.5 G/DL (ref 13–17.7)
LV EF NUC BP: 58 %
LYMPHOCYTES # BLD AUTO: 2.7 10*3/MM3 (ref 0.7–3.1)
LYMPHOCYTES NFR BLD AUTO: 30 % (ref 19.6–45.3)
MAXIMAL PREDICTED HEART RATE: 176 BPM
MCH RBC QN AUTO: 30.1 PG (ref 26.6–33)
MCHC RBC AUTO-ENTMCNC: 33.4 G/DL (ref 31.5–35.7)
MCV RBC AUTO: 90 FL (ref 79–97)
MONOCYTES # BLD AUTO: 0.9 10*3/MM3 (ref 0.1–0.9)
MONOCYTES NFR BLD AUTO: 10 % (ref 5–12)
NEUTROPHILS # BLD AUTO: 4.9 10*3/MM3 (ref 1.7–7)
NEUTROPHILS NFR BLD AUTO: 55.3 % (ref 42.7–76)
NRBC BLD AUTO-RTO: 0.1 /100 WBC (ref 0–0.2)
PERCENT MAX PREDICTED HR: 86.36 %
PLATELET # BLD AUTO: 267 10*3/MM3 (ref 140–450)
PMV BLD AUTO: 6.9 FL (ref 6–12)
POTASSIUM BLD-SCNC: 4.3 MMOL/L (ref 3.5–5.2)
RBC # BLD AUTO: 5.17 10*6/MM3 (ref 4.14–5.8)
SODIUM BLD-SCNC: 138 MMOL/L (ref 136–145)
STRESS BASELINE BP: NORMAL MMHG
STRESS BASELINE HR: 95 BPM
STRESS PERCENT HR: 102 %
STRESS POST ESTIMATED WORKLOAD: 10.1 METS
STRESS POST EXERCISE DUR MIN: 7 MIN
STRESS POST EXERCISE DUR SEC: 14 SEC
STRESS POST PEAK BP: NORMAL MMHG
STRESS POST PEAK HR: 152 BPM
STRESS TARGET HR: 150 BPM
TROPONIN T SERPL-MCNC: <0.01 NG/ML (ref 0–0.03)
TROPONIN T SERPL-MCNC: <0.01 NG/ML (ref 0–0.03)
WBC NRBC COR # BLD: 8.8 10*3/MM3 (ref 3.4–10.8)

## 2020-01-14 PROCEDURE — A9500 TC99M SESTAMIBI: HCPCS | Performed by: INTERNAL MEDICINE

## 2020-01-14 PROCEDURE — 84484 ASSAY OF TROPONIN QUANT: CPT | Performed by: PHYSICIAN ASSISTANT

## 2020-01-14 PROCEDURE — G0378 HOSPITAL OBSERVATION PER HR: HCPCS

## 2020-01-14 PROCEDURE — 99245 OFF/OP CONSLTJ NEW/EST HI 55: CPT | Performed by: INTERNAL MEDICINE

## 2020-01-14 PROCEDURE — 78452 HT MUSCLE IMAGE SPECT MULT: CPT

## 2020-01-14 PROCEDURE — 85025 COMPLETE CBC W/AUTO DIFF WBC: CPT | Performed by: PHYSICIAN ASSISTANT

## 2020-01-14 PROCEDURE — 0 TECHNETIUM SESTAMIBI: Performed by: INTERNAL MEDICINE

## 2020-01-14 PROCEDURE — C1894 INTRO/SHEATH, NON-LASER: HCPCS | Performed by: INTERNAL MEDICINE

## 2020-01-14 PROCEDURE — 99217 PR OBSERVATION CARE DISCHARGE MANAGEMENT: CPT | Performed by: INTERNAL MEDICINE

## 2020-01-14 PROCEDURE — 93458 L HRT ARTERY/VENTRICLE ANGIO: CPT | Performed by: INTERNAL MEDICINE

## 2020-01-14 PROCEDURE — 93016 CV STRESS TEST SUPVJ ONLY: CPT | Performed by: NURSE PRACTITIONER

## 2020-01-14 PROCEDURE — C1769 GUIDE WIRE: HCPCS | Performed by: INTERNAL MEDICINE

## 2020-01-14 PROCEDURE — 78452 HT MUSCLE IMAGE SPECT MULT: CPT | Performed by: INTERNAL MEDICINE

## 2020-01-14 PROCEDURE — 0 IOPAMIDOL PER 1 ML: Performed by: INTERNAL MEDICINE

## 2020-01-14 PROCEDURE — 80048 BASIC METABOLIC PNL TOTAL CA: CPT | Performed by: PHYSICIAN ASSISTANT

## 2020-01-14 PROCEDURE — 25010000002 FENTANYL CITRATE (PF) 100 MCG/2ML SOLUTION: Performed by: INTERNAL MEDICINE

## 2020-01-14 PROCEDURE — 93270 REMOTE 30 DAY ECG REV/REPORT: CPT

## 2020-01-14 PROCEDURE — 93017 CV STRESS TEST TRACING ONLY: CPT

## 2020-01-14 PROCEDURE — 93018 CV STRESS TEST I&R ONLY: CPT | Performed by: INTERNAL MEDICINE

## 2020-01-14 PROCEDURE — 25010000002 MIDAZOLAM PER 1 MG: Performed by: INTERNAL MEDICINE

## 2020-01-14 RX ORDER — SODIUM CHLORIDE 9 MG/ML
250 INJECTION, SOLUTION INTRAVENOUS ONCE AS NEEDED
Status: DISCONTINUED | OUTPATIENT
Start: 2020-01-14 | End: 2020-01-14 | Stop reason: HOSPADM

## 2020-01-14 RX ORDER — DIPHENHYDRAMINE HCL 25 MG
25 TABLET ORAL EVERY 6 HOURS PRN
Status: DISCONTINUED | OUTPATIENT
Start: 2020-01-14 | End: 2020-01-14 | Stop reason: HOSPADM

## 2020-01-14 RX ORDER — FENTANYL CITRATE 50 UG/ML
INJECTION, SOLUTION INTRAMUSCULAR; INTRAVENOUS AS NEEDED
Status: DISCONTINUED | OUTPATIENT
Start: 2020-01-14 | End: 2020-01-14 | Stop reason: HOSPADM

## 2020-01-14 RX ORDER — ALUMINA, MAGNESIA, AND SIMETHICONE 2400; 2400; 240 MG/30ML; MG/30ML; MG/30ML
15 SUSPENSION ORAL EVERY 6 HOURS PRN
Status: DISCONTINUED | OUTPATIENT
Start: 2020-01-14 | End: 2020-01-14 | Stop reason: SDUPTHER

## 2020-01-14 RX ORDER — SODIUM CHLORIDE 9 MG/ML
30 INJECTION, SOLUTION INTRAVENOUS CONTINUOUS
Status: DISCONTINUED | OUTPATIENT
Start: 2020-01-14 | End: 2020-01-14

## 2020-01-14 RX ORDER — ONDANSETRON 4 MG/1
4 TABLET, FILM COATED ORAL EVERY 6 HOURS PRN
Status: DISCONTINUED | OUTPATIENT
Start: 2020-01-14 | End: 2020-01-14 | Stop reason: SDUPTHER

## 2020-01-14 RX ORDER — AMOXICILLIN 250 MG
2 CAPSULE ORAL NIGHTLY
Status: DISCONTINUED | OUTPATIENT
Start: 2020-01-14 | End: 2020-01-14 | Stop reason: HOSPADM

## 2020-01-14 RX ORDER — CLOPIDOGREL BISULFATE 75 MG/1
75 TABLET ORAL DAILY
Qty: 30 TABLET | Refills: 0 | Status: SHIPPED | OUTPATIENT
Start: 2020-01-14 | End: 2020-02-18 | Stop reason: SDUPTHER

## 2020-01-14 RX ORDER — ASPIRIN 81 MG/1
81 TABLET ORAL DAILY
Status: DISCONTINUED | OUTPATIENT
Start: 2020-01-14 | End: 2020-01-14 | Stop reason: SDUPTHER

## 2020-01-14 RX ORDER — MIDAZOLAM HYDROCHLORIDE 1 MG/ML
INJECTION INTRAMUSCULAR; INTRAVENOUS AS NEEDED
Status: DISCONTINUED | OUTPATIENT
Start: 2020-01-14 | End: 2020-01-14 | Stop reason: HOSPADM

## 2020-01-14 RX ORDER — ONDANSETRON 2 MG/ML
4 INJECTION INTRAMUSCULAR; INTRAVENOUS EVERY 6 HOURS PRN
Status: DISCONTINUED | OUTPATIENT
Start: 2020-01-14 | End: 2020-01-14 | Stop reason: SDUPTHER

## 2020-01-14 RX ORDER — CLOPIDOGREL BISULFATE 75 MG/1
75 TABLET ORAL DAILY
Status: DISCONTINUED | OUTPATIENT
Start: 2020-01-14 | End: 2020-01-14 | Stop reason: HOSPADM

## 2020-01-14 RX ORDER — ATROPINE SULFATE 1 MG/ML
.5-1 INJECTION, SOLUTION INTRAMUSCULAR; INTRAVENOUS; SUBCUTANEOUS
Status: DISCONTINUED | OUTPATIENT
Start: 2020-01-14 | End: 2020-01-14 | Stop reason: HOSPADM

## 2020-01-14 RX ORDER — ACETAMINOPHEN 325 MG/1
650 TABLET ORAL EVERY 4 HOURS PRN
Status: DISCONTINUED | OUTPATIENT
Start: 2020-01-14 | End: 2020-01-14 | Stop reason: HOSPADM

## 2020-01-14 RX ORDER — LIDOCAINE HYDROCHLORIDE 20 MG/ML
INJECTION, SOLUTION INFILTRATION; PERINEURAL AS NEEDED
Status: DISCONTINUED | OUTPATIENT
Start: 2020-01-14 | End: 2020-01-14 | Stop reason: HOSPADM

## 2020-01-14 RX ADMIN — Medication 10 ML: at 08:59

## 2020-01-14 RX ADMIN — TECHNETIUM TC 99M SESTAMIBI 1 DOSE: 1 INJECTION INTRAVENOUS at 09:30

## 2020-01-14 RX ADMIN — ASPIRIN 81 MG: 81 TABLET, DELAYED RELEASE ORAL at 08:59

## 2020-01-14 RX ADMIN — TECHNETIUM TC 99M SESTAMIBI 1 DOSE: 1 INJECTION INTRAVENOUS at 10:50

## 2020-01-31 ENCOUNTER — TELEPHONE (OUTPATIENT)
Dept: CARDIOLOGY | Facility: CLINIC | Age: 45
End: 2020-01-31

## 2020-01-31 NOTE — TELEPHONE ENCOUNTER
Patient reported forgot to take monitor with him when out of town 4-5 days and not sure monitor is working .   Noted some reports in chart from [facility].   Called [facility] and informed of above.   They have extended his serivce till 2-4-20 when has appt with Dr. Mejias.   Does not appear monitor is charged and turned on / they will call patient and instruct him of use.   Called patient at present and informed him of above.

## 2020-02-01 LAB
Lab: 1
TOAL ENROLLMENT DAYS: 14

## 2020-02-01 PROCEDURE — 93228 REMOTE 30 DAY ECG REV/REPORT: CPT | Performed by: INTERNAL MEDICINE

## 2020-02-04 ENCOUNTER — OFFICE VISIT (OUTPATIENT)
Dept: CARDIOLOGY | Facility: CLINIC | Age: 45
End: 2020-02-04

## 2020-02-04 VITALS
BODY MASS INDEX: 43.31 KG/M2 | HEART RATE: 84 BPM | HEIGHT: 71 IN | SYSTOLIC BLOOD PRESSURE: 126 MMHG | OXYGEN SATURATION: 97 % | DIASTOLIC BLOOD PRESSURE: 82 MMHG | WEIGHT: 309.4 LBS

## 2020-02-04 DIAGNOSIS — I10 ESSENTIAL HYPERTENSION: Primary | ICD-10-CM

## 2020-02-04 PROCEDURE — 99214 OFFICE O/P EST MOD 30 MIN: CPT | Performed by: INTERNAL MEDICINE

## 2020-02-04 RX ORDER — VALSARTAN AND HYDROCHLOROTHIAZIDE 160; 25 MG/1; MG/1
1 TABLET ORAL DAILY
Qty: 30 TABLET | Refills: 3 | Status: SHIPPED | OUTPATIENT
Start: 2020-02-04 | End: 2020-06-29

## 2020-02-04 NOTE — PROGRESS NOTES
Has pulled a little something in chest but otherwise feels OK    Has felt a few flutters and palps    Got a bump on roght hip and bruised pretty good    GP placed on BP med and had a dry cough    There is amlodipine valsartan and hctz   Call with BP and notify of results    Cardiology Office Visit      Encounter Date:  02/04/2020    Patient ID:   Kemal Dumont is a 44 y.o. male.    Reason For Followup:  Coronary ectasia    Brief Clinical History:  Dear Dr. Coughlin, Reggie Duane, MD    I had the pleasure of seeing Kemal Dumont today. As you are well aware, this is a 44 y.o. male with no established history of ischemic heart disease.  He does have a history of coronary ectasia.  He underwent cardiac catheterization in January 2020 secondary to an abnormal treadmill with nonsustained ventricular tachycardia.  He was found to have significant coronary ectasia involving the RCA, LAD, and circumflex vessels.  He has additional history that includes hypertension, hyperlipidemia, and morbid obesity.  He presents today for follow-up on the above conditions.    Interval History:  He reports that he had one episode of chest discomfort but he was doing some heavy lifting at that time.  He is felt a few palpitations and flutters from time to time but no other significant cardiac findings.  He has experienced no shortness of breath out of character.  He denies any PND orthopnea.  He denies any syncope or near syncope.    He reports that he was placed on a blood pressure medication that he now has a dry cough from.  We will change this to an angiotensin receptor blocker from an ACE inhibitor.  We will have him to continue to monitor his blood pressure.  If it is well controlled we can consider placing him on a poly-pill that includes his amlodipine.  He had an ambulatory ECG monitor that failed to demonstrate any further dysrhythmias.    Assessment & Plan    Impressions:  Severe coronary ectasia  Nonsustained ventricular  "tachycardia on treadmill stress test with no evidence of occlusive disease on catheterization January 2020  Antiplatelet therapy  Hyperlipidemia  Hypertension  Adverse reaction to ACE inhibitors-cough    Recommendations:  Change lisinopril/hydrochlorothiazide to valsartan/hydrochlorothiazide.  Monitor blood pressure.  If blood pressure remains controlled can consider a poly-pill that includes amlodipine  Continue with dual antiplatelet therapy due to coronary ectasia  Basic metabolic panel 1 week  Follow-up in 3 months time sooner should there be difficulties     Objective:    Vitals:  Vitals:    02/04/20 1433   BP: 126/82   Pulse: 84   SpO2: 97%   Weight: (!) 140 kg (309 lb 6.4 oz)   Height: 180.3 cm (71\")       Physical Exam:    General: Alert, cooperative, no distress, appears stated age  Head:  Normocephalic, atraumatic, mucous membranes moist  Eyes:  Conjunctiva/corneas clear, EOM's intact     Neck:  Supple,  no bruit  Lungs: Clear to auscultation bilaterally, no wheezes rhonchi rales are noted  Chest wall: No tenderness  Heart::  Regular rate and rhythm, S1 and S2 normal, 1/6 holosystolic murmur.  No rub or gallop  Abdomen: Soft, non-tender, nondistended bowel sounds active  Extremities: No cyanosis, clubbing, or edema  Pulses: 2+ and symmetric all extremities  Skin:  No rashes or lesions  Neuro/psych: A&O x3. CN II through XII are grossly intact with appropriate affect      Allergies:  No Known Allergies    Medication Review:     Current Outpatient Medications:   •  amLODIPine (NORVASC) 10 MG tablet, Take 10 mg by mouth Daily., Disp: , Rfl:   •  aspirin (ADULT ASPIRIN REGIMEN) 81 MG EC tablet, Take 81 mg by mouth Daily., Disp: , Rfl:   •  atorvastatin (LIPITOR) 40 MG tablet, TAKE ONE TABLET BY MOUTH ONCE DAILY, Disp: 90 tablet, Rfl: 1  •  clopidogrel (PLAVIX) 75 MG tablet, Take 1 tablet by mouth Daily., Disp: 30 tablet, Rfl: 0  •  valsartan-hydrochlorothiazide (DIOVAN HCT) 160-25 MG per tablet, Take 1 tablet " by mouth Daily., Disp: 30 tablet, Rfl: 3    Family History:  Family History   Problem Relation Age of Onset   • Diabetes Father    • Hypertension Father    • Hyperlipidemia Father    • Other Father         lung/respiratory disease    • Stroke Father    • Diabetes Maternal Grandmother    • Lung cancer Maternal Grandfather    • Heart disease Paternal Grandfather        Past Medical History:  Past Medical History:   Diagnosis Date   • Allergic rhinitis    • Anserine bursitis l    left   • Depression    • History of tobacco use    • Hyperlipidemia    • Hypertension    • Left shoulder strain        Past surgical History:  Past Surgical History:   Procedure Laterality Date   • ANKLE ARTHROSCOPY Right 11/2008   • CARDIAC CATHETERIZATION N/A 1/14/2020    Procedure: Left Heart Cath;  Surgeon: Micheal Mejias DO;  Location: [facility] CATH INVASIVE LOCATION;  Service: Cardiology   • TONSILLECTOMY         Social History:  Social History     Socioeconomic History   • Marital status:      Spouse name: Not on file   • Number of children: Not on file   • Years of education: Not on file   • Highest education level: Not on file   Tobacco Use   • Smoking status: Never Smoker   • Smokeless tobacco: Never Used   • Tobacco comment: socially while in high school    Substance and Sexual Activity   • Alcohol use: Yes     Comment: 3 x wk / [place], wine, occasional beer    • Drug use: Not Currently     Types: Marijuana       Review of Systems:  The following systems were reviewed as they relate to the cardiovascular system: Constitutional, Eyes, ENT, Cardiovascular, Respiratory, Gastrointestinal, Integumentary, Neurological, Psychiatric, Hematologic, Endocrine, Musculoskeletal, and Genitourinary. The pertinent cardiovascular findings are reported above with all other cardiovascular points within those systems being negative.    Diagnostic Study Review:     Current Electrocardiogram:  Procedures no new EKG noted.  EKG dated  13 January 2020 demonstrates sinus rhythm with a ventricular rate of 92 bpm and nonspecific repolarization changes.      NOTE: The following portions of the patient's history were reviewed and updated this visit as appropriate: allergies, current medications, past family history, past medical history, past social history, past surgical history and problem list.

## 2020-02-04 NOTE — PATIENT INSTRUCTIONS
Stop lisinopril/HCTZ  Start Valsartan/HCTZ  BMP 1 week  Check BP daily and call with results in 2 weeks     If good control can consider triple pill  F/U 3 months

## 2020-02-18 RX ORDER — CLOPIDOGREL BISULFATE 75 MG/1
75 TABLET ORAL DAILY
Qty: 90 TABLET | Refills: 3 | Status: SHIPPED | OUTPATIENT
Start: 2020-02-18 | End: 2020-02-19 | Stop reason: SDUPTHER

## 2020-02-19 RX ORDER — CLOPIDOGREL BISULFATE 75 MG/1
75 TABLET ORAL DAILY
Qty: 90 TABLET | Refills: 3 | Status: SHIPPED | OUTPATIENT
Start: 2020-02-19 | End: 2021-04-26 | Stop reason: SDUPTHER

## 2020-03-24 RX ORDER — AMLODIPINE BESYLATE 10 MG/1
TABLET ORAL
Qty: 90 TABLET | Refills: 3 | Status: SHIPPED | OUTPATIENT
Start: 2020-03-24 | End: 2021-04-24

## 2020-03-24 RX ORDER — TRAZODONE HYDROCHLORIDE 50 MG/1
TABLET ORAL
Qty: 90 TABLET | Refills: 3 | Status: SHIPPED | OUTPATIENT
Start: 2020-03-24 | End: 2021-04-22

## 2020-04-17 RX ORDER — BUPROPION HYDROCHLORIDE 150 MG/1
TABLET, EXTENDED RELEASE ORAL
Qty: 270 TABLET | Refills: 3 | Status: SHIPPED | OUTPATIENT
Start: 2020-04-17 | End: 2021-06-14 | Stop reason: SDUPTHER

## 2020-04-20 ENCOUNTER — TELEPHONE (OUTPATIENT)
Dept: FAMILY MEDICINE CLINIC | Facility: CLINIC | Age: 45
End: 2020-04-20

## 2020-04-20 RX ORDER — BACLOFEN 10 MG/1
10 TABLET ORAL NIGHTLY
Qty: 30 TABLET | Refills: 1 | Status: SHIPPED | OUTPATIENT
Start: 2020-04-20 | End: 2020-11-10

## 2020-04-20 NOTE — TELEPHONE ENCOUNTER
Patient called complaining of stress headaches. He said you have given him medication in the past for them and he has not had a headache in a while. 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 He is wanting to know if you can send him in something again. Please advise?

## 2020-05-26 ENCOUNTER — TREATMENT (OUTPATIENT)
Dept: PHYSICAL THERAPY | Facility: CLINIC | Age: 45
End: 2020-05-26

## 2020-05-26 DIAGNOSIS — M77.41 METATARSALGIA, RIGHT FOOT: ICD-10-CM

## 2020-05-26 DIAGNOSIS — M77.12 LATERAL EPICONDYLITIS OF LEFT ELBOW: Primary | ICD-10-CM

## 2020-05-26 PROCEDURE — 97140 MANUAL THERAPY 1/> REGIONS: CPT | Performed by: PHYSICAL THERAPIST

## 2020-05-26 PROCEDURE — 97162 PT EVAL MOD COMPLEX 30 MIN: CPT | Performed by: PHYSICAL THERAPIST

## 2020-05-26 PROCEDURE — 97110 THERAPEUTIC EXERCISES: CPT | Performed by: PHYSICAL THERAPIST

## 2020-05-26 PROCEDURE — 97035 APP MDLTY 1+ULTRASOUND EA 15: CPT | Performed by: PHYSICAL THERAPIST

## 2020-05-26 NOTE — PROGRESS NOTES
Physical Therapy Initial Evaluation and Plan of Care    Patient: Kemal Dumont   : 1975  Diagnosis/ICD-10 Code:  Lateral epicondylitis of left elbow [M77.12]  Referring practitioner: Self Referring  Date of Initial Visit: 2020  Today's Date: 2020  Patient seen for 1 sessions           Subjective Questionnaire: QuickDASH: 45% deficit score 31  Acute left elbow pain of gradual nature, no known injury, pain with elbow position for sleep or while driving car, pain with gripping or with playing golf.  More chronic right foot pain, does not keep from activity as much as it is painful.  Notes however that he doesn't choose to walk or be on feet much.  Old hx of ankle stabilizing sx on right.  Pain just prox to 2nd digit right foot and pain with wt bearing       Subjective Evaluation    History of Present Illness  Mechanism of injury: No known injury      Patient Occupation:  Tim lumbar Quality of life: good    Pain  Current pain ratin  Quality: dull ache, needle-like, pressure, radiating, throbbing, tight, knife-like and discomfort  Relieving factors: change in position and rest  Aggravating factors: keyboarding, overhead activity, outstretched reach, sleeping, prolonged positioning, standing and repetitive movement  Progression: worsening    Social Support  Lives with: adult children    Hand dominance: right    Diagnostic Tests  No diagnostic tests performed    Treatments  No previous or current treatments  Patient Goals  Patient goals for therapy: decreased pain, increased motion, increased strength, independence with ADLs/IADLs and return to sport/leisure activities             Objective          Palpation     Additional Palpation Details  + lateral epicondylitis left, tender to palpate and with passive wrist flex and elbow extension    Active Range of Motion     Additional Active Range of Motion Details  Elbow ROM WNL; however, wrist flex with elbow extension limited to  80 degrees and + for pain    Passive Range of Motion     Additional Passive Range of Motion Details  Passive df right (-) 5 degrees to neutral and 2nd toe MTP flexion is (-) 8 degrees to neutral.      Strength/Myotome Testing     Additional Strength Details  Resisted testing wrist extension left + lateral epicondyle pain  Foot and ankle strength WNL    Ambulation     Comments   Antalgic wt bear right forefoot          Assessment & Plan     Assessment  Impairments: abnormal gait, abnormal or restricted ROM, activity intolerance, lacks appropriate home exercise program and pain with function  Assessment details: Issue #1, acute lateral epicondylitis left with tenderness to palpate and tender with resisted wrist extension    Issue #2 metatarsalgia contributed to by poor mechanics and flexibility of right foot and ankle with limited flexion 2nd to and ? Dropped met head #2    Patient will benefit from HEP for deep tissue massage and stretching for elbow with suggested purchase of tennis elbow splint, use of modalities as needed for pain relief.  Treatment for right foot suggest instruction in HEP, use of metatarsal pad.    Prognosis: good  Functional Limitations: carrying objects, lifting, walking, pulling, pushing, uncomfortable because of pain and unable to perform repetitive tasks  Goals  Plan Goals: STG  Initiate HEP for management of lateral epicondylitis and metatarsalgia in 1 week           Purchase of tennis elbow splint and metatarsal pain in 1 week    LTG  Tennis elbow/lateral epicondylitis pain rating to 3 or less by discharge             DASH to 20% or less by discharge            Return to golfing to levels of choice by discharge            Plan  Therapy options: will be seen for skilled physical therapy services  Planned modality interventions: cryotherapy, ultrasound and electrical stimulation/Russian stimulation  Planned therapy interventions: manual therapy, strengthening, stretching, soft tissue  mobilization and home exercise program  Treatment plan discussed with: patient  Plan details: Suggest emphasis to HEP with follow up as needed up to 6 visits to reach above goals        Timed:         Manual Therapy:    15     mins  67068;     Therapeutic Exercise:    15     mins  58384;     Neuromuscular Scotty:    0    mins  01676;    Therapeutic Activity:     0     mins  08470;     Gait Trainin     mins  91751;     Ultrasound:     10     mins  76808;    Ionto                               0    mins   04322    Un-Timed:  Electrical Stimulation:    0     mins  69031 ( );  Dry Needling     0     mins self-pay  Traction     0     mins 16506  Low Eval     0     Mins  84002  Mod Eval     20     Mins  13647  High Eval                       0     Mins  73782        Timed Treatment:   40   mins   Total Treatment:     60   mins    PT SIGNATURE: Staci Stoll, PT   DATE TREATMENT INITIATED: 2020    Initial Certification  Certification Period: 2020  I certify that the therapy services are furnished while this patient is under my care.  The services outlined above are required by this patient, and will be reviewed every 90 days.     PHYSICIAN: Referring, Self      DATE:     Please sign and return via fax to [phone].. Thank you, [facility].

## 2020-06-02 ENCOUNTER — TREATMENT (OUTPATIENT)
Dept: PHYSICAL THERAPY | Facility: CLINIC | Age: 45
End: 2020-06-02

## 2020-06-02 DIAGNOSIS — M77.12 LATERAL EPICONDYLITIS OF LEFT ELBOW: Primary | ICD-10-CM

## 2020-06-02 DIAGNOSIS — M77.41 METATARSALGIA, RIGHT FOOT: ICD-10-CM

## 2020-06-02 PROCEDURE — 97035 APP MDLTY 1+ULTRASOUND EA 15: CPT | Performed by: PHYSICAL THERAPIST

## 2020-06-02 NOTE — PROGRESS NOTES
Physical Therapy Daily Progress Note        Patient: Kemal Dumont   : 1975  Diagnosis/ICD-10 Code:  Lateral epicondylitis of left elbow [M77.12]  Referring practitioner: Self Referring  Date of Initial Visit: Type: THERAPY  Noted: 2020  Today's Date: 2020  Patient seen for 2 sessions         Kemal Dumont reports: has purchased recommended items for foot and elbow, doing stretches, sees some improvement, primary issue is sleeping on elbow with pain      Subjective     Objective   See Exercise, Manual, and Modality Logs for complete treatment.   US with review of HEP      Assessment/Plan    Good initial response, recheck in 2 weeks           Timed:         Manual Therapy:    0     mins  40738;     Therapeutic Exercise:    0     mins  15066;     Neuromuscular Scotty:    0    mins  14370;    Therapeutic Activity:     0     mins  94784;     Gait Trainin     mins  45532;     Ultrasound:     10     mins  63962;    Ionto                               0    mins   33545  Self Care                       0     mins   48898  Canalith Repos               0    mins  4209    Un-Timed:  Electrical Stimulation:    0     mins  32711 ( );  Dry Needling     0     mins self-pay  Traction     0     mins 48300  Low Eval     0     Mins  87326  Mod Eval     0     Mins  86442  High Eval                       0     Mins  47581    Timed Treatment:   10   mins   Total Treatment:     10   mins    Staci Stoll PT  Physical Therapist  [place] license [id number]

## 2020-06-03 RX ORDER — ATORVASTATIN CALCIUM 40 MG/1
TABLET, FILM COATED ORAL
Qty: 90 TABLET | Refills: 1 | Status: SHIPPED | OUTPATIENT
Start: 2020-06-03 | End: 2020-11-10

## 2020-06-29 RX ORDER — VALSARTAN AND HYDROCHLOROTHIAZIDE 160; 25 MG/1; MG/1
TABLET ORAL
Qty: 30 TABLET | Refills: 3 | Status: SHIPPED | OUTPATIENT
Start: 2020-06-29 | End: 2021-01-07 | Stop reason: SDUPTHER

## 2020-07-28 ENCOUNTER — DOCUMENTATION (OUTPATIENT)
Dept: PHYSICAL THERAPY | Facility: CLINIC | Age: 45
End: 2020-07-28

## 2020-07-28 NOTE — PROGRESS NOTES
Discharge Summary  Discharge Summary from Physical Therapy Report      Dates  PT visit: last visit 6/2/20  Number of Visits: 2     Discharge Status of Patient: See  Note dated 6/2/20    Goals: Partially Met    Discharge Plan: patient instructed in HEP, did not return after 2 visits, present status unknown    Comments     Date of Discharge 7/28/20        Staci Stoll, PT  Physical Therapist

## 2020-11-09 ENCOUNTER — TELEPHONE (OUTPATIENT)
Dept: CARDIOLOGY | Facility: CLINIC | Age: 45
End: 2020-11-09

## 2020-11-09 NOTE — TELEPHONE ENCOUNTER
Patient called and states he went to [facility] to have labs drawn. He states it was completely packed with people and he waited for an hour with no movement and decided to leave.    He states he will go to [facility] in [place] for labs and fax to us.

## 2020-11-10 RX ORDER — ATORVASTATIN CALCIUM 40 MG/1
TABLET, FILM COATED ORAL
Qty: 90 TABLET | Refills: 1 | Status: SHIPPED | OUTPATIENT
Start: 2020-11-10 | End: 2021-06-14 | Stop reason: SDUPTHER

## 2020-11-10 RX ORDER — BACLOFEN 10 MG/1
TABLET ORAL
Qty: 30 TABLET | Refills: 1 | Status: SHIPPED | OUTPATIENT
Start: 2020-11-10 | End: 2022-04-05 | Stop reason: SDUPTHER

## 2020-11-10 NOTE — TELEPHONE ENCOUNTER
Patient called to see about getting a refill on these meds. I returned patients call and left a message informing this was sent.

## 2021-01-07 RX ORDER — VALSARTAN AND HYDROCHLOROTHIAZIDE 160; 25 MG/1; MG/1
1 TABLET ORAL DAILY
Qty: 90 TABLET | Refills: 3 | Status: SHIPPED | OUTPATIENT
Start: 2021-01-07 | End: 2021-06-14 | Stop reason: SDUPTHER

## 2021-01-22 ENCOUNTER — TELEPHONE (OUTPATIENT)
Dept: CARDIOLOGY | Facility: CLINIC | Age: 46
End: 2021-01-22

## 2021-01-22 NOTE — TELEPHONE ENCOUNTER
Letter has been mailed to patient regarding missing labs (BMP) ordered 2/4/2020 for medication changes.

## 2021-03-05 LAB
BUN SERPL-MCNC: 16 MG/DL (ref 6–24)
BUN/CREAT SERPL: 16 (ref 9–20)
CALCIUM SERPL-MCNC: 10.1 MG/DL (ref 8.7–10.2)
CHLORIDE SERPL-SCNC: 100 MMOL/L (ref 96–106)
CO2 SERPL-SCNC: 26 MMOL/L (ref 20–29)
CREAT SERPL-MCNC: 0.98 MG/DL (ref 0.76–1.27)
GLUCOSE SERPL-MCNC: 99 MG/DL (ref 65–99)
POTASSIUM SERPL-SCNC: 4.3 MMOL/L (ref 3.5–5.2)
SODIUM SERPL-SCNC: 141 MMOL/L (ref 134–144)

## 2021-04-22 RX ORDER — TRAZODONE HYDROCHLORIDE 50 MG/1
TABLET ORAL
Qty: 90 TABLET | Refills: 3 | Status: SHIPPED | OUTPATIENT
Start: 2021-04-22 | End: 2021-06-14 | Stop reason: SDUPTHER

## 2021-04-24 RX ORDER — AMLODIPINE BESYLATE 10 MG/1
TABLET ORAL
Qty: 90 TABLET | Refills: 0 | Status: SHIPPED | OUTPATIENT
Start: 2021-04-24 | End: 2021-06-14 | Stop reason: SDUPTHER

## 2021-04-26 RX ORDER — CLOPIDOGREL BISULFATE 75 MG/1
75 TABLET ORAL DAILY
Qty: 30 TABLET | Refills: 0 | Status: SHIPPED | OUTPATIENT
Start: 2021-04-26 | End: 2021-06-14 | Stop reason: SDUPTHER

## 2021-06-03 RX ORDER — CLOPIDOGREL BISULFATE 75 MG/1
TABLET ORAL
Qty: 30 TABLET | Refills: 0 | OUTPATIENT
Start: 2021-06-03

## 2021-06-03 RX ORDER — BUPROPION HYDROCHLORIDE 150 MG/1
TABLET, EXTENDED RELEASE ORAL
Qty: 270 TABLET | Refills: 3 | OUTPATIENT
Start: 2021-06-03

## 2021-06-04 NOTE — TELEPHONE ENCOUNTER
lvm letting patient know that he will need an office visit in order to get medication refill  (please send note when appt is made and Dr Coughlin may be able to refill the medication to last at least until the appt date)    Hub to read

## 2021-06-14 ENCOUNTER — OFFICE VISIT (OUTPATIENT)
Dept: FAMILY MEDICINE CLINIC | Facility: CLINIC | Age: 46
End: 2021-06-14

## 2021-06-14 VITALS
DIASTOLIC BLOOD PRESSURE: 92 MMHG | BODY MASS INDEX: 42.28 KG/M2 | OXYGEN SATURATION: 99 % | RESPIRATION RATE: 16 BRPM | SYSTOLIC BLOOD PRESSURE: 146 MMHG | HEART RATE: 98 BPM | TEMPERATURE: 98.1 F | WEIGHT: 302 LBS | HEIGHT: 71 IN

## 2021-06-14 DIAGNOSIS — R53.82 CHRONIC FATIGUE: ICD-10-CM

## 2021-06-14 DIAGNOSIS — F33.1 MODERATE EPISODE OF RECURRENT MAJOR DEPRESSIVE DISORDER (HCC): ICD-10-CM

## 2021-06-14 DIAGNOSIS — E78.2 MIXED HYPERLIPIDEMIA: ICD-10-CM

## 2021-06-14 DIAGNOSIS — G47.10 HYPERSOMNOLENCE: ICD-10-CM

## 2021-06-14 DIAGNOSIS — I10 ESSENTIAL HYPERTENSION: Primary | ICD-10-CM

## 2021-06-14 DIAGNOSIS — I47.20 VT (VENTRICULAR TACHYCARDIA) (HCC): ICD-10-CM

## 2021-06-14 DIAGNOSIS — E66.01 MORBIDLY OBESE (HCC): ICD-10-CM

## 2021-06-14 PROCEDURE — 99213 OFFICE O/P EST LOW 20 MIN: CPT | Performed by: FAMILY MEDICINE

## 2021-06-14 RX ORDER — AMLODIPINE BESYLATE 10 MG/1
10 TABLET ORAL DAILY
Qty: 90 TABLET | Refills: 3 | Status: SHIPPED | OUTPATIENT
Start: 2021-06-14 | End: 2021-09-16 | Stop reason: SDUPTHER

## 2021-06-14 RX ORDER — ATORVASTATIN CALCIUM 40 MG/1
40 TABLET, FILM COATED ORAL DAILY
Qty: 90 TABLET | Refills: 3 | Status: SHIPPED | OUTPATIENT
Start: 2021-06-14 | End: 2023-03-30

## 2021-06-14 RX ORDER — TRAZODONE HYDROCHLORIDE 50 MG/1
50 TABLET ORAL
Qty: 90 TABLET | Refills: 3 | Status: SHIPPED | OUTPATIENT
Start: 2021-06-14 | End: 2022-06-29 | Stop reason: ALTCHOICE

## 2021-06-14 RX ORDER — ASPIRIN 81 MG/1
81 TABLET ORAL DAILY
Qty: 90 TABLET | Refills: 3 | Status: SHIPPED | OUTPATIENT
Start: 2021-06-14

## 2021-06-14 RX ORDER — BUPROPION HYDROCHLORIDE 150 MG/1
150 TABLET, EXTENDED RELEASE ORAL 2 TIMES DAILY
Qty: 180 TABLET | Refills: 3 | Status: SHIPPED | OUTPATIENT
Start: 2021-06-14 | End: 2023-03-30

## 2021-06-14 RX ORDER — VALSARTAN AND HYDROCHLOROTHIAZIDE 160; 25 MG/1; MG/1
1 TABLET ORAL DAILY
Qty: 90 TABLET | Refills: 3 | Status: SHIPPED | OUTPATIENT
Start: 2021-06-14 | End: 2021-09-16

## 2021-06-14 RX ORDER — CLOPIDOGREL BISULFATE 75 MG/1
75 TABLET ORAL DAILY
Qty: 90 TABLET | Refills: 3 | Status: SHIPPED | OUTPATIENT
Start: 2021-06-14 | End: 2023-03-30 | Stop reason: SDUPTHER

## 2021-06-14 NOTE — PROGRESS NOTES
"Chief Complaint  Hypertension and Hyperlipidemia    Subjective         Kemal Dumont presents to [facility] for   Follow-up of hypertension.  His only complaint today is severe fatigue with daytime hypersomnolence.    See review of systems below. Pertinent past medical history includes depression, hypertension, hyperlipidemia, and tachycardia.       PHQ-2 Total Score: 0  PHQ-9 Total Score: 0    Review of Systems   Constitutional: Positive for fatigue. Negative for chills and fever.   Respiratory: Negative for cough and shortness of breath.    Cardiovascular: Negative for chest pain and palpitations.   Gastrointestinal: Negative for constipation, diarrhea, nausea and vomiting.   Musculoskeletal: Negative for back pain and neck pain.   Skin: Negative for rash.   Neurological: Negative for dizziness and headaches.   Psychiatric/Behavioral: Positive for sleep disturbance.       Objective   Vital Signs:   /92 (BP Location: Left arm, Patient Position: Sitting, Cuff Size: Large Adult)   Pulse 98   Temp 98.1 °F (36.7 °C) (Infrared)   Resp 16   Ht 180.3 cm (71\")   Wt (!) 137 kg (302 lb)   SpO2 99%   BMI 42.12 kg/m²     Physical Exam  Constitutional:       General: He is not in acute distress.     Appearance: He is well-developed. He is obese.   Cardiovascular:      Rate and Rhythm: Normal rate and regular rhythm.      Heart sounds: Normal heart sounds. No murmur heard.     Pulmonary:      Effort: Pulmonary effort is normal.      Breath sounds: Normal breath sounds. No wheezing or rales.   Abdominal:      General: Bowel sounds are normal. There is no distension.      Palpations: Abdomen is soft.   Musculoskeletal:         General: Normal range of motion.   Skin:     General: Skin is warm and dry.   Neurological:      Mental Status: He is alert and oriented to person, place, and time.   Psychiatric:         Behavior: Behavior normal.        Result Review :                   Diagnoses and all " orders for this visit:    1. Essential hypertension (Primary)  Assessment & Plan:  Fair control with nonideal.  Previous blood pressure was normal.  Recheck blood pressure in 4 to 6 months but continue current medication dose.    Orders:  -     CBC & Differential    2. Mixed hyperlipidemia  Assessment & Plan:  Recheck blood work and call with results.    Orders:  -     Comprehensive Metabolic Panel  -     Lipid Panel    3. Chronic fatigue  Assessment & Plan:  With associated morbid obesity and daytime hypersomnolence.  Most likely sleep apnea.  Encouraged weight loss.  We will get a sleep study.    Orders:  -     TSH  -     Ambulatory Referral to Sleep Medicine    4. Hypersomnolence  -     Ambulatory Referral to Sleep Medicine    5. VT (ventricular tachycardia) (CMS/[facility])  Assessment & Plan:  Resolved.      6. Moderate episode of recurrent major depressive disorder (CMS/[facility])  Assessment & Plan:  Patient's depression is recurrent and is moderate without psychosis. Their depression is currently active and the condition is unchanged. This will be reassessed at the next regular appointment. F/U as described:patient was prescribed an antidepressant medicine.      7. Morbidly obese (CMS/[facility])  Assessment & Plan:  Patient's (Body mass index is 42.12 kg/m².) indicates that they are morbidly obese (BMI > 40 or > 35 with obesity - related health condition) with obesity-related health conditions that include obstructive sleep apnea and hypertension . Obesity is unchanged. BMI is is above average; BMI management plan is completed. We discussed portion control and increasing exercise.       Other orders  -     valsartan-hydrochlorothiazide (DIOVAN-HCT) 160-25 MG per tablet; Take 1 tablet by mouth Daily.  Dispense: 90 tablet; Refill: 3  -     traZODone (DESYREL) 50 MG tablet; Take 1 tablet by mouth every night at bedtime.  Dispense: 90 tablet; Refill: 3  -     atorvastatin (LIPITOR) 40 MG tablet; Take 1 tablet by mouth Daily.   Dispense: 90 tablet; Refill: 3  -     buPROPion SR (WELLBUTRIN SR) 150 MG 12 hr tablet; Take 1 tablet by mouth 2 (Two) Times a Day.  Dispense: 180 tablet; Refill: 3  -     clopidogrel (Plavix) 75 MG tablet; Take 1 tablet by mouth Daily.  Dispense: 90 tablet; Refill: 3  -     aspirin (aspirin) 81 MG EC tablet; Take 1 tablet by mouth Daily.  Dispense: 90 tablet; Refill: 3  -     amLODIPine (NORVASC) 10 MG tablet; Take 1 tablet by mouth Daily.  Dispense: 90 tablet; Refill: 3        Follow Up   No follow-ups on file.  Patient was given instructions and counseling regarding his condition or for health maintenance advice. Please see specific information pulled into the AVS if appropriate.     Reggie Duane Lyell, MD  6/14/2021  This note was electronically signed.

## 2021-06-14 NOTE — ASSESSMENT & PLAN NOTE
Patient's depression is recurrent and is moderate without psychosis. Their depression is currently active and the condition is unchanged. This will be reassessed at the next regular appointment. F/U as described:patient was prescribed an antidepressant medicine.

## 2021-06-14 NOTE — ASSESSMENT & PLAN NOTE
With associated morbid obesity and daytime hypersomnolence.  Most likely sleep apnea.  Encouraged weight loss.  We will get a sleep study.

## 2021-06-14 NOTE — ASSESSMENT & PLAN NOTE
Patient's (Body mass index is 42.12 kg/m².) indicates that they are morbidly obese (BMI > 40 or > 35 with obesity - related health condition) with obesity-related health conditions that include obstructive sleep apnea and hypertension . Obesity is unchanged. BMI is is above average; BMI management plan is completed. We discussed portion control and increasing exercise.

## 2021-06-14 NOTE — ASSESSMENT & PLAN NOTE
Fair control with nonideal.  Previous blood pressure was normal.  Recheck blood pressure in 4 to 6 months but continue current medication dose.

## 2021-08-11 DIAGNOSIS — R74.8 ELEVATED LIVER ENZYMES: Primary | ICD-10-CM

## 2021-08-11 LAB
ALBUMIN SERPL-MCNC: 4.5 G/DL (ref 4–5)
ALBUMIN/GLOB SERPL: 1.8 {RATIO} (ref 1.2–2.2)
ALP SERPL-CCNC: 53 IU/L (ref 48–121)
ALT SERPL-CCNC: 110 IU/L (ref 0–44)
AST SERPL-CCNC: 46 IU/L (ref 0–40)
BASOPHILS # BLD AUTO: 0.1 X10E3/UL (ref 0–0.2)
BASOPHILS NFR BLD AUTO: 1 %
BILIRUB SERPL-MCNC: 0.4 MG/DL (ref 0–1.2)
BUN SERPL-MCNC: 15 MG/DL (ref 6–24)
BUN/CREAT SERPL: 15 (ref 9–20)
CALCIUM SERPL-MCNC: 9.9 MG/DL (ref 8.7–10.2)
CHLORIDE SERPL-SCNC: 101 MMOL/L (ref 96–106)
CHOLEST SERPL-MCNC: 184 MG/DL (ref 100–199)
CO2 SERPL-SCNC: 25 MMOL/L (ref 20–29)
CREAT SERPL-MCNC: 0.98 MG/DL (ref 0.76–1.27)
EOSINOPHIL # BLD AUTO: 0.2 X10E3/UL (ref 0–0.4)
EOSINOPHIL NFR BLD AUTO: 3 %
ERYTHROCYTE [DISTWIDTH] IN BLOOD BY AUTOMATED COUNT: 13.7 % (ref 11.6–15.4)
GLOBULIN SER CALC-MCNC: 2.5 G/DL (ref 1.5–4.5)
GLUCOSE SERPL-MCNC: 103 MG/DL (ref 65–99)
HCT VFR BLD AUTO: 51.9 % (ref 37.5–51)
HDLC SERPL-MCNC: 46 MG/DL
HGB BLD-MCNC: 16.8 G/DL (ref 13–17.7)
IMM GRANULOCYTES # BLD AUTO: 0.1 X10E3/UL (ref 0–0.1)
IMM GRANULOCYTES NFR BLD AUTO: 1 %
LDLC SERPL CALC-MCNC: 119 MG/DL (ref 0–99)
LYMPHOCYTES # BLD AUTO: 2.6 X10E3/UL (ref 0.7–3.1)
LYMPHOCYTES NFR BLD AUTO: 29 %
MCH RBC QN AUTO: 28.7 PG (ref 26.6–33)
MCHC RBC AUTO-ENTMCNC: 32.4 G/DL (ref 31.5–35.7)
MCV RBC AUTO: 89 FL (ref 79–97)
MONOCYTES # BLD AUTO: 0.7 X10E3/UL (ref 0.1–0.9)
MONOCYTES NFR BLD AUTO: 8 %
NEUTROPHILS # BLD AUTO: 5.5 X10E3/UL (ref 1.4–7)
NEUTROPHILS NFR BLD AUTO: 58 %
PLATELET # BLD AUTO: 287 X10E3/UL (ref 150–450)
POTASSIUM SERPL-SCNC: 4.8 MMOL/L (ref 3.5–5.2)
PROT SERPL-MCNC: 7 G/DL (ref 6–8.5)
RBC # BLD AUTO: 5.86 X10E6/UL (ref 4.14–5.8)
SODIUM SERPL-SCNC: 142 MMOL/L (ref 134–144)
TRIGL SERPL-MCNC: 105 MG/DL (ref 0–149)
TSH SERPL DL<=0.005 MIU/L-ACNC: 1.65 UIU/ML (ref 0.45–4.5)
VLDLC SERPL CALC-MCNC: 19 MG/DL (ref 5–40)
WBC # BLD AUTO: 9.2 X10E3/UL (ref 3.4–10.8)

## 2021-08-24 NOTE — PROGRESS NOTES
Chief Complaint  Sleeping Problem    Subjective          Kemal Dumont presents to [facility] NEUROLOGY  History of Present Illness  Referred by Dr. Coughlin for Hypersomnolence and chronic fatigue. He snores probably in all positions     Patient states he uses breath right strips every night to help him sleep    The patient c/o daytime sleepiness issues:  chronic fatigue.      There is no history of hypnagogic hallucinations, sleep paralysis or cataplexy.    The patient complains of snoring has dry mouth or sore mouth when he wakes up.    The patient complains of Leg symptoms: NO.     The patient complains of problems with insomnia:  Can't turn brain off at night.     The patient reports history of these childhood sleep problems: There is no h/O sleepwalking or bedwetting or nightmares or sleep eating or acting out dreams    Sleep schedule: Bedtime:1AM-2AM , gets out of bed at 8AM-9AM, sleep latency: WITHIN 15 MINS, Gets about 5-6 hours of sleep.    EPWORTH SLEEPINESS SCALE  Sitting and reading 2 WatchingTV 0  Sitting, inactive, in a public place 0  As a passenger in a car for 1 hour w/o a break  0  Lying down to rest in the afternoon  2  Sitting and talking to someone  0  Sitting quietly after a lunch  0  In a car, while stopped for traffic or a light  0  Total 4    Review of Systems   Constitutional: Negative for chills and fever.   HENT: Negative for postnasal drip, sinus pressure and sinus pain.    Eyes: Negative for photophobia.   Respiratory: Positive for apnea. Negative for cough and shortness of breath.    Cardiovascular: Negative for palpitations.   Gastrointestinal: Negative for abdominal distention and abdominal pain.   Endocrine: Negative for cold intolerance and heat intolerance.   Genitourinary: Negative for frequency and urgency.   Musculoskeletal: Negative for back pain and neck pain.   Neurological: Negative for dizziness and headaches.   Psychiatric/Behavioral: Negative for  "decreased concentration and sleep disturbance.         Objective   Vital Signs:   /99   Pulse 83   Temp 97.7 °F (36.5 °C) (Temporal)   Resp 16   Ht 180.3 cm (71\")   Wt 136 kg (300 lb)   BMI 41.84 kg/m²     Physical Exam  Vitals reviewed.   Constitutional:       Appearance: Normal appearance. He is obese.   HENT:      Head: Normocephalic.      Nose: Nose normal.      Mouth/Throat:      Mouth: Mucous membranes are moist.   Eyes:      Extraocular Movements: Extraocular movements intact.      Pupils: Pupils are equal, round, and reactive to light.   Cardiovascular:      Rate and Rhythm: Normal rate.      Pulses: Normal pulses.   Pulmonary:      Effort: No respiratory distress.   Musculoskeletal:      Right lower leg: No edema.      Left lower leg: No edema.   Skin:     General: Skin is warm and dry.   Neurological:      General: No focal deficit present.      Mental Status: He is alert and oriented to person, place, and time.   Psychiatric:         Mood and Affect: Mood normal.        Result Review :                 Assessment and Plan    Diagnoses and all orders for this visit:    1. Obstructive sleep apnea (Primary)    2. Insomnia, unspecified type        Follow Up   Return in about 3 months (around 11/30/2021).  Patient was given instructions and counseling regarding his condition or for health maintenance advice. Please see specific information pulled into the AVS if appropriate.       This document has been electronically signed by Joseph Seipel, MD on August 30, 2021 11:40 EDT  "

## 2021-08-30 ENCOUNTER — OFFICE VISIT (OUTPATIENT)
Dept: NEUROLOGY | Facility: CLINIC | Age: 46
End: 2021-08-30

## 2021-08-30 VITALS
HEIGHT: 71 IN | DIASTOLIC BLOOD PRESSURE: 99 MMHG | WEIGHT: 300 LBS | HEART RATE: 83 BPM | TEMPERATURE: 97.7 F | BODY MASS INDEX: 42 KG/M2 | RESPIRATION RATE: 16 BRPM | SYSTOLIC BLOOD PRESSURE: 143 MMHG

## 2021-08-30 DIAGNOSIS — G47.33 OBSTRUCTIVE SLEEP APNEA: Primary | ICD-10-CM

## 2021-08-30 DIAGNOSIS — G47.00 INSOMNIA, UNSPECIFIED TYPE: ICD-10-CM

## 2021-08-30 PROCEDURE — 99244 OFF/OP CNSLTJ NEW/EST MOD 40: CPT | Performed by: PSYCHIATRY & NEUROLOGY

## 2021-08-30 RX ORDER — ZOLPIDEM TARTRATE 5 MG/1
5 TABLET ORAL NIGHTLY PRN
Qty: 1 TABLET | Refills: 0 | Status: SHIPPED | OUTPATIENT
Start: 2021-08-30 | End: 2022-06-29 | Stop reason: ALTCHOICE

## 2021-09-08 ENCOUNTER — OFFICE VISIT (OUTPATIENT)
Dept: GASTROENTEROLOGY | Facility: CLINIC | Age: 46
End: 2021-09-08

## 2021-09-08 ENCOUNTER — TELEPHONE (OUTPATIENT)
Dept: GASTROENTEROLOGY | Facility: CLINIC | Age: 46
End: 2021-09-08

## 2021-09-08 VITALS — HEIGHT: 71 IN | WEIGHT: 303 LBS | BODY MASS INDEX: 42.42 KG/M2 | TEMPERATURE: 98.9 F

## 2021-09-08 DIAGNOSIS — R79.89 ELEVATED LIVER FUNCTION TESTS: Primary | ICD-10-CM

## 2021-09-08 DIAGNOSIS — Z86.010 PERSONAL HISTORY OF COLONIC POLYPS: ICD-10-CM

## 2021-09-08 DIAGNOSIS — Z12.11 ENCOUNTER FOR SCREENING FOR MALIGNANT NEOPLASM OF COLON: ICD-10-CM

## 2021-09-08 DIAGNOSIS — Z80.0 FAMILY HISTORY OF COLON CANCER: ICD-10-CM

## 2021-09-08 PROCEDURE — 99204 OFFICE O/P NEW MOD 45 MIN: CPT | Performed by: NURSE PRACTITIONER

## 2021-09-08 NOTE — PROGRESS NOTES
Chief Complaint   Patient presents with   • Elevated Hepatic Enzymes       HPI    Kemal Dumont is a  46 y.o. male here to establish care as a new patient for elevated liver enzymes.    This patient will also follow with Dr. Treviño.    Reviewed recent labs drawn in August with AST 46,  (77) and normal alkaline phosphatase.  No recent liver imaging.    Today patient reports some intermittent rectal bleeding with bright red blood on toilet paper but denies constipation or diarrhea.  He had a colonoscopy around age 20 and feels like he may have had polyps at that time.  Reports 2 second-degree family members with history of colon cancer.    He admits to poor eating habits, increased weight gain, and increased alcohol usage during the pandemic.  His current weight is 303 pounds.  There is never been IV currently.  There are no tattoos.  He has never been on blood transfusion.  There is no family history of liver disease that he knows of.    No upper GI complaints.    Past Medical History:   Diagnosis Date   • Allergic rhinitis    • Anserine bursitis l    left   • Depression    • History of tobacco use    • Hyperlipidemia    • Hypertension    • Left shoulder strain    • VT (ventricular tachycardia) (CMS/HCC) 1/13/2020    Added automatically from request for surgery [id number]       Past Surgical History:   Procedure Laterality Date   • ANKLE ARTHROSCOPY Right 11/2008   • CARDIAC CATHETERIZATION N/A 1/14/2020    Procedure: Left Heart Cath;  Surgeon: Micheal Mejias DO;  Location: [facility] CATH INVASIVE LOCATION;  Service: Cardiology   • COLONOSCOPY     • TONSILLECTOMY         Scheduled Meds:     Continuous Infusions: No current facility-administered medications for this visit.      PRN Meds:     No Known Allergies    Social History     Socioeconomic History   • Marital status:      Spouse name: Not on file   • Number of children: Not on file   • Years of education: Not on file   • Highest  education level: Not on file   Tobacco Use   • Smoking status: Never Smoker   • Smokeless tobacco: Never Used   • Tobacco comment: socially while in high school    Vaping Use   • Vaping Use: Never used   Substance and Sexual Activity   • Alcohol use: Yes     Alcohol/week: 12.0 standard drinks     Types: 8 Glasses of wine, 4 Standard drinks or equivalent per week     Comment: 3 x wk / [place], wine, occasional beer    • Drug use: Not Currently     Types: Marijuana   • Sexual activity: Yes     Partners: Female     Birth control/protection: Surgical       Family History   Problem Relation Age of Onset   • Diabetes Father    • Hypertension Father    • Hyperlipidemia Father    • Other Father         lung/respiratory disease    • Stroke Father    • Colon polyps Father    • Diabetes Maternal Grandmother    • Lung cancer Maternal Grandfather    • Liver cancer Maternal Grandfather    • Heart disease Paternal Grandfather    • Colon cancer Maternal Uncle        Review of Systems   Constitutional: Negative for activity change, appetite change, fatigue and unexpected weight change.   HENT: Negative for trouble swallowing.    Eyes: Negative.    Respiratory: Negative.    Cardiovascular: Negative.    Gastrointestinal: Positive for anal bleeding. Negative for abdominal distention, abdominal pain, blood in stool, constipation, diarrhea, nausea, rectal pain and vomiting.   Endocrine: Negative.    Genitourinary: Negative.    Musculoskeletal: Negative.    Allergic/Immunologic: Negative.    Neurological: Negative.    Hematological: Negative.    Psychiatric/Behavioral: Negative.        Vitals:    09/08/21 1409   Temp: 98.9 °F (37.2 °C)       Physical Exam  Constitutional:       Appearance: He is well-developed. He is obese.   HENT:      Head: Normocephalic.      Nose: Nose normal.   Eyes:      Pupils: Pupils are equal, round, and reactive to light.   Cardiovascular:      Rate and Rhythm: Normal rate and regular rhythm.      Pulses: Normal  pulses.      Heart sounds: Normal heart sounds.   Pulmonary:      Effort: Pulmonary effort is normal.   Abdominal:      General: Bowel sounds are normal. There is no distension.      Palpations: Abdomen is soft. There is no mass.      Tenderness: There is no abdominal tenderness. There is no guarding.      Hernia: No hernia is present.   Musculoskeletal:         General: Normal range of motion.      Cervical back: Normal range of motion.   Skin:     General: Skin is warm and dry.      Capillary Refill: Capillary refill takes less than 2 seconds.   Neurological:      Mental Status: He is alert and oriented to person, place, and time.   Psychiatric:         Behavior: Behavior normal.     Assessment    1. Elevated liver function tests  - Hepatitis Panel, Acute  - Anti-Smooth Muscle Antibody Titer  - Mitochondrial Antibodies, M2  - DARIO  - Hemochromatosis Mutation  - Ceruloplasmin  - Alpha - 1 - Antitrypsin  - Gamma GT  - US Liver  - [facility] Fibrosure  - Celiac Comprehensive Panel  - Iron and TIBC  - Ferritin  - Protein Elec + Interp, Serum    2. Encounter for screening for malignant neoplasm of colon  - Case Request; Standing  - Case Request    3. Personal history of colonic polyps    4. Family history of colon cancer    Plan    Arrange colonoscopy with Dr. Treviño for screening purposes as well to further evaluate for episodes of rectal bleeding which sounds hemorrhoidal in nature.  Serologic work-up to rule out autoimmune, viral, metabolic liver disease.  Obtain dedicated liver ultrasound.  Counseled the patient on reduction of daily alcohol intake.  I think the likely diagnosis here is going to be fatty liver disease given the fact that his BMI is over 40.  Follow-up and further recommendations pending the aforementioned work-up.           CARIDAD Zayas  [facility]  [address] [place] [address]  Office: [phone]

## 2021-09-09 ENCOUNTER — LAB (OUTPATIENT)
Dept: GASTROENTEROLOGY | Facility: CLINIC | Age: 46
End: 2021-09-09

## 2021-09-10 ENCOUNTER — TELEPHONE (OUTPATIENT)
Dept: GASTROENTEROLOGY | Facility: CLINIC | Age: 46
End: 2021-09-10

## 2021-09-10 NOTE — TELEPHONE ENCOUNTER
----- Message from CARIDAD Zayas sent at 9/9/2021  3:58 PM EDT -----  Patient needs clearance from prescribing provider to hold Plavix prior to endoscopic exam.

## 2021-09-12 LAB
A1AT SERPL-MCNC: 142 MG/DL (ref 101–187)
ANA SER QL: NEGATIVE
CERULOPLASMIN SERPL-MCNC: 22.3 MG/DL (ref 16–31)
FERRITIN SERPL-MCNC: 166 NG/ML (ref 30–400)
GGT SERPL-CCNC: 66 IU/L (ref 0–65)
HAV IGM SERPL QL IA: NEGATIVE
HBV CORE IGM SERPL QL IA: NEGATIVE
HBV SURFACE AG SERPL QL IA: NEGATIVE
HCV AB S/CO SERPL IA: <0.1 S/CO RATIO (ref 0–0.9)
IRON SATN MFR SERPL: 20 % (ref 15–55)
IRON SERPL-MCNC: 67 UG/DL (ref 38–169)
TIBC SERPL-MCNC: 338 UG/DL (ref 250–450)
UIBC SERPL-MCNC: 271 UG/DL (ref 111–343)

## 2021-09-13 ENCOUNTER — TELEPHONE (OUTPATIENT)
Dept: GASTROENTEROLOGY | Facility: CLINIC | Age: 46
End: 2021-09-13

## 2021-09-13 DIAGNOSIS — R74.8 ELEVATED LIVER ENZYMES: Primary | ICD-10-CM

## 2021-09-13 LAB
ACTIN IGG SERPL-ACNC: 5 UNITS (ref 0–19)
ALBUMIN SERPL ELPH-MCNC: 3.6 G/DL (ref 2.9–4.4)
ALBUMIN/GLOB SERPL: 1.2 {RATIO} (ref 0.7–1.7)
ALPHA1 GLOB SERPL ELPH-MCNC: 0.3 G/DL (ref 0–0.4)
ALPHA2 GLOB SERPL ELPH-MCNC: 0.9 G/DL (ref 0.4–1)
B-GLOBULIN SERPL ELPH-MCNC: 1.1 G/DL (ref 0.7–1.3)
ENDOMYSIUM IGA SER QL: NEGATIVE
GAMMA GLOB SERPL ELPH-MCNC: 0.8 G/DL (ref 0.4–1.8)
GLIADIN PEPTIDE IGA SER-ACNC: 3 UNITS (ref 0–19)
GLIADIN PEPTIDE IGG SER-ACNC: 3 UNITS (ref 0–19)
GLOBULIN SER CALC-MCNC: 3 G/DL (ref 2.2–3.9)
IGA SERPL-MCNC: 221 MG/DL (ref 90–386)
LABORATORY COMMENT REPORT: NORMAL
M PROTEIN SERPL ELPH-MCNC: NORMAL G/DL
MITOCHONDRIA M2 IGG SER-ACNC: <20 UNITS (ref 0–20)
PROT PATTERN SERPL ELPH-IMP: NORMAL
PROT SERPL-MCNC: 6.6 G/DL (ref 6–8.5)
TTG IGA SER-ACNC: <2 U/ML (ref 0–3)
TTG IGG SER-ACNC: 4 U/ML (ref 0–5)

## 2021-09-13 NOTE — TELEPHONE ENCOUNTER
----- Message from Aminta Crum sent at 9/13/2021  9:44 AM EDT -----  Regarding: Lab Results  Contact: [phone]  Pt calling regarding lab results.  Please contact regarding these results. Thank You

## 2021-09-13 NOTE — TELEPHONE ENCOUNTER
Per Ced, Reggie Duane, MD Stowers, Sharon G, RN  OK to hold Plavix for procedure.       Called pt and advised of Dr Coughlin's note.  Pt verbalized understanding.

## 2021-09-13 NOTE — TELEPHONE ENCOUNTER
Patient called. Advised as per Madhavi's note. He verb understanding and will have his lab work drawn tomorrow.

## 2021-09-14 ENCOUNTER — LAB (OUTPATIENT)
Dept: GASTROENTEROLOGY | Facility: CLINIC | Age: 46
End: 2021-09-14

## 2021-09-14 ENCOUNTER — HOSPITAL ENCOUNTER (OUTPATIENT)
Dept: ULTRASOUND IMAGING | Facility: HOSPITAL | Age: 46
Discharge: HOME | End: 2021-09-14

## 2021-09-14 ENCOUNTER — LAB (OUTPATIENT)
Dept: LAB | Facility: HOSPITAL | Age: 46
End: 2021-09-14

## 2021-09-14 LAB
A2 MACROGLOB SERPL-MCNC: 252 MG/DL (ref 110–276)
ALT SERPL W P-5'-P-CCNC: 77 IU/L (ref 0–55)
APO A-I SERPL-MCNC: 125 MG/DL (ref 101–178)
AST SERPL W P-5'-P-CCNC: 33 IU/L (ref 0–40)
BILIRUB SERPL-MCNC: 0.1 MG/DL (ref 0–1.2)
CHOLEST SERPL-MCNC: 164 MG/DL (ref 100–199)
FERRITIN SERPL-MCNC: 164 NG/ML (ref 30–400)
FIBROSIS SCORING:: ABNORMAL
FIBROSIS STAGE SERPL QL: ABNORMAL
GGT SERPL-CCNC: 64 IU/L (ref 0–65)
GGT SERPL-CCNC: 72 U/L (ref 8–61)
GLUCOSE SERPL-MCNC: 111 MG/DL (ref 65–99)
HAPTOGLOB SERPL-MCNC: 117 MG/DL (ref 23–355)
INTERPRETATIONS: (REFERENCE): ABNORMAL
LABORATORY COMMENT REPORT: ABNORMAL
LIVER FIBR SCORE SERPL CALC.FIBROSURE: 0.19 (ref 0–0.21)
NASH SCORING (REFERENCE): ABNORMAL
NECROINFLAMMATORY ACT GRADE SERPL QL: ABNORMAL
NECROINFLAMMATORY ACT SCORE SERPL: 0.5
SERVICE CMNT-IMP: ABNORMAL
STEATOSIS GRADE (REFERENCE): ABNORMAL
STEATOSIS GRADING (REFERENCE): ABNORMAL
STEATOSIS SCORE (REFERENCE): 0.75 (ref 0–0.3)
TRIGL SERPL-MCNC: 223 MG/DL (ref 0–149)

## 2021-09-14 PROCEDURE — 76705 ECHO EXAM OF ABDOMEN: CPT

## 2021-09-14 NOTE — PROGRESS NOTES
Let Kemal know ultrasound shows diffuse fatty liver.  Treatment for fatty liver consist of low-fat diet, increase cardiovascular exercise, and weight loss to achieve an ideal BMI.  Also recommend he see the nutritionist at [facility] to discuss further.  If he is agreeable please place referral to [facility] nutritionist.    Arrange a 4-week follow-up to discuss endoscopic findings and review work-up today.

## 2021-09-14 NOTE — PROGRESS NOTES
Sorry for the second message.  Liver work-up shows no evidence of autoimmune or viral condition.  Probable [facility].  Can discuss further at next office visit.

## 2021-09-15 ENCOUNTER — TELEPHONE (OUTPATIENT)
Dept: GASTROENTEROLOGY | Facility: CLINIC | Age: 46
End: 2021-09-15

## 2021-09-15 DIAGNOSIS — R63.4 WEIGHT REDUCTION: Primary | ICD-10-CM

## 2021-09-15 LAB
ANA SER QL: NEGATIVE
CERULOPLASMIN SERPL-MCNC: 23.9 MG/DL (ref 16–31)
HFE GENE MUT ANL BLD/T: NORMAL
MITOCHONDRIA M2 IGG SER-ACNC: <20 UNITS (ref 0–20)

## 2021-09-15 NOTE — TELEPHONE ENCOUNTER
Patient called. Advised as per Madhavi's note. He verb understanding and would like a referral to the nutritionist.   F/u visit scheduled with Madhavi on 10/15@1100.   Referral to [facility] nutritionist.

## 2021-09-15 NOTE — TELEPHONE ENCOUNTER
Dayanara Best, RegSched Rep  P k [facility] [address]  Phone Number:  [phone] (Call me)   Pt returned call please contact pt.  Thank You

## 2021-09-15 NOTE — TELEPHONE ENCOUNTER
----- Message from CARIDAD Zayas sent at 9/14/2021  5:32 PM EDT -----  Let Kemal know ultrasound shows diffuse fatty liver.  Treatment for fatty liver consist of low-fat diet, increase cardiovascular exercise, and weight loss to achieve an ideal BMI.  Also recommend he see the nutritionist at [facility] to discuss further.    If he is agreeable please place referral to [facility] nutritionist.    Arrange a 4-week follow-up to discuss endoscopic findings and review work-up today.

## 2021-09-15 NOTE — TELEPHONE ENCOUNTER
----- Message from CARIDAD Zayas sent at 9/14/2021  5:35 PM EDT -----  Sorry for the second message.  Liver work-up shows no evidence of autoimmune or viral condition.  Probable [facility].  Can discuss further at next office visit.

## 2021-09-16 ENCOUNTER — OFFICE VISIT (OUTPATIENT)
Dept: CARDIOLOGY | Facility: CLINIC | Age: 46
End: 2021-09-16

## 2021-09-16 ENCOUNTER — TELEPHONE (OUTPATIENT)
Dept: CARDIOLOGY | Facility: CLINIC | Age: 46
End: 2021-09-16

## 2021-09-16 VITALS
HEIGHT: 71 IN | WEIGHT: 300 LBS | OXYGEN SATURATION: 98 % | BODY MASS INDEX: 42 KG/M2 | DIASTOLIC BLOOD PRESSURE: 91 MMHG | HEART RATE: 91 BPM | SYSTOLIC BLOOD PRESSURE: 133 MMHG

## 2021-09-16 DIAGNOSIS — E78.2 MIXED HYPERLIPIDEMIA: ICD-10-CM

## 2021-09-16 DIAGNOSIS — I10 ESSENTIAL HYPERTENSION: ICD-10-CM

## 2021-09-16 DIAGNOSIS — I77.89 CORONARY ARTERY ECTASIA (HCC): Primary | ICD-10-CM

## 2021-09-16 LAB
A2 MACROGLOB SERPL-MCNC: 281 MG/DL (ref 110–276)
ALT SERPL W P-5'-P-CCNC: 103 IU/L (ref 0–55)
APO A-I SERPL-MCNC: 129 MG/DL (ref 101–178)
AST SERPL W P-5'-P-CCNC: 44 IU/L (ref 0–40)
BILIRUB SERPL-MCNC: 0.2 MG/DL (ref 0–1.2)
CHOLEST SERPL-MCNC: 174 MG/DL (ref 100–199)
FIBROSIS SCORING:: ABNORMAL
FIBROSIS STAGE SERPL QL: ABNORMAL
GGT SERPL-CCNC: 71 IU/L (ref 0–65)
GLUCOSE SERPL-MCNC: 89 MG/DL (ref 65–99)
HAPTOGLOB SERPL-MCNC: 127 MG/DL (ref 23–355)
INTERPRETATIONS: (REFERENCE): ABNORMAL
LABORATORY COMMENT REPORT: ABNORMAL
LIVER FIBR SCORE SERPL CALC.FIBROSURE: 0.31 (ref 0–0.21)
NASH SCORING (REFERENCE): ABNORMAL
NECROINFLAMMATORY ACT GRADE SERPL QL: ABNORMAL
NECROINFLAMMATORY ACT SCORE SERPL: 0.5
SERVICE CMNT-IMP: ABNORMAL
STEATOSIS GRADE (REFERENCE): ABNORMAL
STEATOSIS GRADING (REFERENCE): ABNORMAL
STEATOSIS SCORE (REFERENCE): 0.7 (ref 0–0.3)
TRIGL SERPL-MCNC: 171 MG/DL (ref 0–149)

## 2021-09-16 PROCEDURE — 99214 OFFICE O/P EST MOD 30 MIN: CPT | Performed by: INTERNAL MEDICINE

## 2021-09-16 PROCEDURE — 93000 ELECTROCARDIOGRAM COMPLETE: CPT | Performed by: INTERNAL MEDICINE

## 2021-09-16 RX ORDER — AMLODIPINE BESYLATE 10 MG/1
10 TABLET ORAL DAILY
Qty: 90 TABLET | Refills: 3 | Status: SHIPPED | OUTPATIENT
Start: 2021-09-16 | End: 2022-12-02

## 2021-09-16 RX ORDER — AMLODIPINE, VALSARTAN AND HYDROCHLOROTHIAZIDE 10; 160; 25 MG/1; MG/1; MG/1
1 TABLET ORAL DAILY
Qty: 90 TABLET | Refills: 3 | Status: SHIPPED | OUTPATIENT
Start: 2021-09-16 | End: 2021-09-17

## 2021-09-16 RX ORDER — AMLODIPINE, VALSARTAN AND HYDROCHLOROTHIAZIDE 10; 160; 25 MG/1; MG/1; MG/1
1 TABLET ORAL DAILY
Qty: 90 TABLET | Refills: 3 | Status: SHIPPED | OUTPATIENT
Start: 2021-09-16 | End: 2021-09-16 | Stop reason: SDUPTHER

## 2021-09-16 NOTE — PROGRESS NOTES
Cardiology Office Visit      Encounter Date:  09/16/2021    Patient ID:   Kemal Dumont is a 46 y.o. male.    Reason For Followup:  Coronary ectasia    Brief Clinical History:  Dear Dr. Coughlin, Reggie Duane, MD    I had the pleasure of seeing Kemal Dumont today. As you are well aware, this is a 46 y.o. male with no established history of ischemic heart disease.  He does have a history of coronary ectasia.  He underwent cardiac catheterization in January 2020 secondary to an abnormal treadmill with nonsustained ventricular tachycardia.  He was found to have significant coronary ectasia involving the RCA, LAD, and circumflex vessels.  He has additional history that includes hypertension, hyperlipidemia, and morbid obesity.  He presents today for follow-up on the above conditions.    Interval History:  He denies any chest pain pressure heaviness or tightness.  He denies any shortness of breath out of character.  He denies any PND orthopnea.  He denies any syncope or near syncope.  He reports feeling well from a cardiac perspective.    We had made changes to his antihypertensive regimen to combat a chronic cough.  We switched him to valsartan/hydrochlorothiazide and he has done well with this.  He still has an occasional cough but not nearly as intense as it has been in the past.  We then attempted to place him on a polypill including amlodipine, valsartan, and hydrochlorothiazide however according to his drug supplier, this was unavailable.    He reports he is going to have a colonoscopy.  We discussed antiplatelet therapy.  He should be low risk to discontinue these for his procedure.    His most recent laboratory data on file is from August 2021.  His lipid profile demonstrates total cholesterol of 184, triglycerides 105, HDL 46, LDL of 119.  He reports that he is in the process of a liver work-up due to some abnormal LFTs.  I have advised him to discuss with his gastroenterologist that he does take a statin  "which should be taken into consideration for his liver abnormalities.    As I am sure you are aware, the patient underwent a cardiac catheterization in January 2020.  This was secondary to nonsustained ventricular tachycardia revealed on stress testing.  No angiographic occlusive disease was noted however he did have significant coronary ectasia which prompted the utilization of antiplatelet therapy.      Assessment & Plan    Impressions:  Severe coronary ectasia  Nonsustained ventricular tachycardia on treadmill stress test with no evidence of occlusive disease on catheterization January 2020  Antiplatelet therapy  Hyperlipidemia  Hypertension  Adverse reaction to ACE inhibitors-cough    Recommendations:  Continuation of his current cardiovascular regimen at present time.  Low risk to discontinue antiplatelet therapy for colonoscopy  Discussed statin therapy with gastroenterologist  Follow-up in 9 months time sooner should there be difficulties.    Objective:    Vitals:  Vitals:    09/16/21 1341   BP: 133/91   BP Location: Left arm   Patient Position: Sitting   Cuff Size: Large Adult   Pulse: 91   SpO2: 98%   Weight: 136 kg (300 lb)   Height: 180.3 cm (71\")       Physical Exam:    General: Alert, cooperative, no distress, appears stated age  Head:  Normocephalic, atraumatic, mucous membranes moist  Eyes:  Conjunctiva/corneas clear, EOM's intact     Neck:  Supple,  no bruit  Lungs: Clear to auscultation bilaterally, no wheezes rhonchi rales are noted  Chest wall: No tenderness  Heart::  Regular rate and rhythm, S1 and S2 normal, 1/6 holosystolic murmur.  No rub or gallop  Abdomen: Soft, non-tender, nondistended bowel sounds active  Extremities: No cyanosis, clubbing, or edema  Pulses: 2+ and symmetric all extremities  Skin:  No rashes or lesions  Neuro/psych: A&O x3. CN II through XII are grossly intact with appropriate affect      Allergies:  No Known Allergies    Medication Review:     Current Outpatient " Medications:   •  amLODIPine (NORVASC) 10 MG tablet, Take 1 tablet by mouth Daily., Disp: 90 tablet, Rfl: 3  •  aspirin (aspirin) 81 MG EC tablet, Take 1 tablet by mouth Daily., Disp: 90 tablet, Rfl: 3  •  atorvastatin (LIPITOR) 40 MG tablet, Take 1 tablet by mouth Daily., Disp: 90 tablet, Rfl: 3  •  baclofen (LIORESAL) 10 MG tablet, TAKE ONE TABLET BY MOUTH EVERY NIGHT, Disp: 30 tablet, Rfl: 1  •  buPROPion SR (WELLBUTRIN SR) 150 MG 12 hr tablet, Take 1 tablet by mouth 2 (Two) Times a Day., Disp: 180 tablet, Rfl: 3  •  clopidogrel (Plavix) 75 MG tablet, Take 1 tablet by mouth Daily., Disp: 90 tablet, Rfl: 3  •  traZODone (DESYREL) 50 MG tablet, Take 1 tablet by mouth every night at bedtime., Disp: 90 tablet, Rfl: 3  •  valsartan-hydrochlorothiazide (DIOVAN-HCT) 160-25 MG per tablet, Take 1 tablet by mouth Daily., Disp: 90 tablet, Rfl: 3  •  zolpidem (Ambien) 5 MG tablet, Take 1 tablet by mouth At Night As Needed for Sleep., Disp: 1 tablet, Rfl: 0    Family History:  Family History   Problem Relation Age of Onset   • Diabetes Father    • Hypertension Father    • Hyperlipidemia Father    • Other Father         lung/respiratory disease    • Stroke Father    • Colon polyps Father    • Diabetes Maternal Grandmother    • Lung cancer Maternal Grandfather    • Liver cancer Maternal Grandfather    • Heart disease Paternal Grandfather    • Colon cancer Maternal Uncle        Past Medical History:  Past Medical History:   Diagnosis Date   • Allergic rhinitis    • Anserine bursitis l    left   • Depression    • History of tobacco use    • Hyperlipidemia    • Hypertension    • Left shoulder strain    • VT (ventricular tachycardia) (CMS/HCC) 1/13/2020    Added automatically from request for surgery [id number]       Past surgical History:  Past Surgical History:   Procedure Laterality Date   • ANKLE ARTHROSCOPY Right 11/2008   • CARDIAC CATHETERIZATION N/A 1/14/2020    Procedure: Left Heart Cath;  Surgeon: Micheal Mejias  DO [place];  Location: [facility] INVASIVE LOCATION;  Service: Cardiology   • COLONOSCOPY     • TONSILLECTOMY         Social History:  Social History     Socioeconomic History   • Marital status:      Spouse name: Not on file   • Number of children: Not on file   • Years of education: Not on file   • Highest education level: Not on file   Tobacco Use   • Smoking status: Never Smoker   • Smokeless tobacco: Never Used   • Tobacco comment: socially while in high school    Vaping Use   • Vaping Use: Never used   Substance and Sexual Activity   • Alcohol use: Yes     Alcohol/week: 12.0 standard drinks     Types: 8 Glasses of wine, 4 Standard drinks or equivalent per week     Comment: 3 x wk / [place], wine, occasional beer    • Drug use: Not Currently     Types: Marijuana   • Sexual activity: Yes     Partners: Female     Birth control/protection: Surgical       Review of Systems:  The following systems were reviewed as they relate to the cardiovascular system: Constitutional, Eyes, ENT, Cardiovascular, Respiratory, Gastrointestinal, Integumentary, Neurological, Psychiatric, Hematologic, Endocrine, Musculoskeletal, and Genitourinary. The pertinent cardiovascular findings are reported above with all other cardiovascular points within those systems being negative.    Diagnostic Study Review:     Current Electrocardiogram:    ECG 12 Lead    Date/Time: 9/16/2021 3:38 PM  Performed by: Micheal Mejias DO  Authorized by: Micheal Mejias DO   Comparison: compared with previous ECG from 1/13/2021  Similar to previous ECG  Comments: Normal sinus rhythm with a ventricular rate of 92 bpm.  Consider right ventricular hypertrophy.  Age-indeterminate inferior MI.  Lateral leads are involved as well.  Normal QT and QTc intervals.              NOTE: The following portions of the patient's note were reviewed, confirmed and/or updated this visit as appropriate: History of present illness/Interval history,  physical examination, assessment & plan, allergies, current medications, past family history, past medical history, past social history, past surgical history and problem list.

## 2021-09-16 NOTE — TELEPHONE ENCOUNTER
I spoke with Amparo at [employer] and informed her per Dr. Magalie Sommer should return to his original BP med due to the Xforge-hctz is not available. She verbalized understanding and added his refills to his current prescriptions.

## 2021-09-17 LAB — HFE GENE MUT ANL BLD/T: NORMAL

## 2021-09-17 RX ORDER — VALSARTAN AND HYDROCHLOROTHIAZIDE 160; 25 MG/1; MG/1
1 TABLET ORAL DAILY
Qty: 90 TABLET | Refills: 3
Start: 2021-09-17 | End: 2022-04-05

## 2021-09-20 ENCOUNTER — LAB REQUISITION (OUTPATIENT)
Dept: LAB | Facility: HOSPITAL | Age: 46
End: 2021-09-20

## 2021-09-20 DIAGNOSIS — Z00.00 ENCOUNTER FOR GENERAL ADULT MEDICAL EXAMINATION WITHOUT ABNORMAL FINDINGS: ICD-10-CM

## 2021-09-20 LAB — SARS-COV-2 ORF1AB RESP QL NAA+PROBE: NOT DETECTED

## 2021-09-20 PROCEDURE — U0004 COV-19 TEST NON-CDC HGH THRU: HCPCS | Performed by: INTERNAL MEDICINE

## 2021-09-21 ENCOUNTER — HOSPITAL ENCOUNTER (OUTPATIENT)
Dept: SLEEP MEDICINE | Facility: HOSPITAL | Age: 46
Discharge: HOME | End: 2021-09-21
Admitting: PSYCHIATRY & NEUROLOGY

## 2021-09-21 DIAGNOSIS — G47.33 OBSTRUCTIVE SLEEP APNEA: ICD-10-CM

## 2021-09-21 DIAGNOSIS — G47.00 INSOMNIA, UNSPECIFIED TYPE: ICD-10-CM

## 2021-09-21 PROCEDURE — 95806 SLEEP STUDY UNATT&RESP EFFT: CPT | Performed by: PSYCHIATRY & NEUROLOGY

## 2021-09-21 PROCEDURE — 95806 SLEEP STUDY UNATT&RESP EFFT: CPT

## 2021-09-23 ENCOUNTER — TELEPHONE (OUTPATIENT)
Dept: CARDIOLOGY | Facility: CLINIC | Age: 46
End: 2021-09-23

## 2021-09-23 ENCOUNTER — OUTSIDE FACILITY SERVICE (OUTPATIENT)
Dept: GASTROENTEROLOGY | Facility: CLINIC | Age: 46
End: 2021-09-23

## 2021-09-23 ENCOUNTER — TELEPHONE (OUTPATIENT)
Dept: GASTROENTEROLOGY | Facility: CLINIC | Age: 46
End: 2021-09-23

## 2021-09-23 DIAGNOSIS — R89.9 ABNORMAL LABORATORY TEST: Primary | ICD-10-CM

## 2021-09-23 PROCEDURE — 45378 DIAGNOSTIC COLONOSCOPY: CPT | Performed by: INTERNAL MEDICINE

## 2021-09-23 NOTE — TELEPHONE ENCOUNTER
----- Message from Micheal Mejias DO sent at 9/23/2021  7:47 AM EDT -----  Contact: [phone]  I advised the patient to discuss this with his gastroenterologist at our last office visit.  ----- Message -----  From: Landy Cooper MA  Sent: 9/17/2021   1:57 PM EDT  To: Micheal Mejias DO    Patient called in with a concern about taking Lipitor due to liver issues.Is he okay to take this medication.    *Returned call to patient and there was no answer,so I LVM with details and to Cb with any questions.

## 2021-09-23 NOTE — TELEPHONE ENCOUNTER
----- Message from Micheal Mejias DO sent at 9/23/2021  7:52 AM EDT -----  Regarding: RE: Colonoscopy  I believe this was also addressed at his office visit.  He will be low risk to come off of any antiplatelets prior to colonoscopy.  This should be resumed at the earliest opportunity post procedure.  ----- Message -----  From: Landy Cooper MA  Sent: 9/14/2021  11:50 AM EDT  To: Micheal Mejias DO  Subject: Colonoscopy                                      Patient called and requested clearance for a colonoscopy on 9/23/2021.He also asked what medications he would need to hold if cleared.    Please Advise.

## 2021-10-01 ENCOUNTER — TELEPHONE (OUTPATIENT)
Dept: GASTROENTEROLOGY | Facility: CLINIC | Age: 46
End: 2021-10-01

## 2021-10-01 NOTE — TELEPHONE ENCOUNTER
----- Message from Alvaro Treviño MD sent at 9/30/2021  5:18 PM EDT -----  Colonoscopy recall 10 years

## 2021-12-15 ENCOUNTER — TELEPHONE (OUTPATIENT)
Dept: NEUROLOGY | Facility: CLINIC | Age: 46
End: 2021-12-15

## 2021-12-15 NOTE — TELEPHONE ENCOUNTER
----- Message from Joseph F Seipel, MD sent at 9/26/2021  6:51 PM EDT -----  Home sleep test completed set up on auto CPAP 6-16

## 2022-02-21 ENCOUNTER — TREATMENT (OUTPATIENT)
Dept: PHYSICAL THERAPY | Facility: CLINIC | Age: 47
End: 2022-02-21

## 2022-02-21 DIAGNOSIS — M25.371 ANKLE INSTABILITY, RIGHT: ICD-10-CM

## 2022-02-21 DIAGNOSIS — M25.571 RIGHT ANKLE PAIN, UNSPECIFIED CHRONICITY: Primary | ICD-10-CM

## 2022-02-21 PROCEDURE — 97161 PT EVAL LOW COMPLEX 20 MIN: CPT | Performed by: PHYSICAL THERAPIST

## 2022-02-21 PROCEDURE — 97140 MANUAL THERAPY 1/> REGIONS: CPT | Performed by: PHYSICAL THERAPIST

## 2022-02-21 PROCEDURE — 97110 THERAPEUTIC EXERCISES: CPT | Performed by: PHYSICAL THERAPIST

## 2022-02-21 NOTE — PROGRESS NOTES
"  Physical Therapy Initial Evaluation and Plan of Care    Patient: Kemal Dumont   : 1975  Diagnosis/ICD-10 Code:  Right ankle pain, unspecified chronicity [M25.571]  Referring practitioner: CARIDAD Choi  Date of Initial Visit: 2022  Today's Date: 2022  Patient seen for 1 sessions           Subjective Questionnaire: PT Functional Test: FAAM = 76/84, indicating 90% ability and 10% impairment      Subjective Evaluation    History of Present Illness  Mechanism of injury: Sustained R ankle fx 2022 dislocation and reduced on its own. Pt reports he has been out of boot ~2-3 wk. Has been using velcro lace-up brace since then. States he has not worn it a couple times and uses high-top boots when brace is not on. Pt with hx of R ankle pain related to old ankle surgery in  to repair ligament. Has to elevate his leg at the end of the day. States ankle is tender to touch at inside and outside of ankle. MD told him that this injury likely damaged ligament that had been repaired in the past and wants him to participate in PT x1 mo before deciding if surgery needs to be done to repair. No pain at rest. Has some mild pain with ankle movement. Has tenderness with standing and walking. States ankle is stiff and sore in the morning. States his calf is very tight. States he feels like he walks and stands on R toes a lot. Difficulty walking without shoes on at home. Difficulty with stairs and has to do them 1 at a time first thing in the morning. Pt reports leg length discrepancy with R LE being ~ 1\" shorter, uses heel lift but does not feel like it helps much.    PT order dx: recurrent tear ATFL, talar dome bone contusion and bone contusion of dorsal medial talar head, mild tibiotalar arthritis.      Patient Occupation: [employer], at desk mostly Quality of life: good    Pain  Current pain ratin  At best pain ratin  Location: R ankle  Quality: dull ache and tight  Relieving factors: " "rest and ice  Aggravating factors: stairs, standing, movement, prolonged positioning, ambulation and squatting  Progression: improved    Social Support  Lives in: multiple-level home    Patient Goals  Patient goals for therapy: decreased pain, improved balance, increased motion, increased strength and return to sport/leisure activities  Patient goal: Play golf and walking for exercise           Objective          Observations     Additional Ankle/Foot Observation Details  Static standing with shoes on: R heel lifted with wt bearing on R forefoot and through L foot.  Static standing barefoot: R heel ~1\" off floor with wt bearing on R forefoot and toes. Appears equal wt bearing B LE.  Gait: decreased R heel strike, increased lateral wt bearing during stance phase with minimal heel contact with ground.    Added 3-layer heel lift to shoe in addition to heel lift already present (~1/4\"). Pt reported he felt level and gait felt more normal.    Tenderness     Right Ankle/Foot   Tenderness in the anterior ankle and deltoid ligament.     Active Range of Motion   Left Ankle/Foot   Dorsiflexion (ke): 6 degrees   Inversion: 28 degrees   Eversion: 20 degrees     Right Ankle/Foot   Dorsiflexion (ke): 0 degrees   Inversion: 24 degrees   Eversion: 14 degrees     Strength/Myotome Testing     Left Ankle/Foot   Normal strength    Right Ankle/Foot   Dorsiflexion: 4+  Plantar flexion: 4+  Inversion: 4+  Eversion: 4    Functional Assessment     Single Leg Stance   Left: 16 seconds  Right: 13 seconds          Assessment & Plan     Assessment  Impairments: abnormal gait, abnormal or restricted ROM, activity intolerance, impaired balance, impaired physical strength, lacks appropriate home exercise program and pain with function  Functional Limitations: sleeping, walking, uncomfortable because of pain, sitting, standing and stooping  Assessment details: Pt is 45 yo male with R ankle pain and instability. Pt sustained recurrent tear of R " "ATFL,  talar dome bone contusion and bone contusion of dorsal medial talar head when he rolled his ankle in Jan 2022. Pt presents with decreased R ankle AROM and decreased strength. Pt with significant tightness of R gastroc contributed to by leg length discrepancy with R LE being ~1\" shorter. Pt ambulated with minimal to no contact of R heel with ground and has increased wt bearing and transition of wt to lateral aspect of foot. Pt is having difficulty with stairs. Difficulty walking on uneven surfaces. Pt's goal is to return to playing golf. FAAM indicates 10% impairment.    Patient presents with the impairments listed above and based on the objective findings and the physical therapy evaluation, the patient’s condition has the potential to improve in response to therapy.   The patient’s condition and/or services required are at a level of complexity that necessitates the skill & supervision of a physical therapist.    Prognosis: good    Goals  Plan Goals: STG to be met in 3 wk:  - Increase R ankle DF AROM to 5 deg.  - Pt to perform R SLS x25 sec.  - Pt to be independent with HEP.  LTG to be met in 12 wk:  - Improve FAAM score to 95% or better.  - Increase R ankle strength to 5/5 in all directions for improve ability to walk on golf course.  - Pt to negotiate stairs with reciprocal pattern to use all areas of his home.  - Increase R ankle DF to 10 deg.    Plan  Therapy options: will be seen for skilled therapy services  Planned modality interventions: thermotherapy (hydrocollator packs) and cryotherapy  Planned therapy interventions: balance/weight-bearing training, manual therapy, flexibility, functional ROM exercises, gait training, home exercise program, joint mobilization, neuromuscular re-education, soft tissue mobilization, strengthening, stretching, therapeutic activities, transfer training, body mechanics training and postural training  Frequency: 2x week  Duration in weeks: 12  Treatment plan discussed " with: patient        Timed:         Manual Therapy:    8     mins  73980;     Therapeutic Exercise:    15     mins  52612;     Neuromuscular Scotty:        mins  98662;    Therapeutic Activity:          mins  01782;     Gait Training:           mins  44086;     Ultrasound:          mins  83534;    Ionto                                   mins   51164  Can Repos          mins 46729  Self - Care                          mins  83092    Un-Timed:  Electrical Stimulation:         mins  07651 ( );  Dry Needling          03306/03609  Traction          mins 01214  Low Eval     20     Mins  30818  Mod Eval          Mins  98129  High Eval                            Mins  95898      Timed Treatment:   23   mins   Total Treatment:     43   mins      PT SIGNATURE: Amparo Lane PT, CLT  [place] Lic # [id number]  Electronically signed by Amparo Lane PT, 02/21/22, 1:23 PM EST    Initial Certification  Certification Period: 2/21/2022 thru 5/21/2022  I certify that the therapy services are furnished while this patient is under my care.  The services outlined above are required by this patient, and will be reviewed every 90 days.     PHYSICIAN: Brooke Starks APRN _____________________________________________________  NPI: [id number]                                      DATE:    Please sign and return via fax to [phone]. Thank you, [facility].

## 2022-03-01 ENCOUNTER — TREATMENT (OUTPATIENT)
Dept: PHYSICAL THERAPY | Facility: CLINIC | Age: 47
End: 2022-03-01

## 2022-03-01 DIAGNOSIS — M25.371 ANKLE INSTABILITY, RIGHT: ICD-10-CM

## 2022-03-01 DIAGNOSIS — M25.571 RIGHT ANKLE PAIN, UNSPECIFIED CHRONICITY: Primary | ICD-10-CM

## 2022-03-01 PROCEDURE — 97140 MANUAL THERAPY 1/> REGIONS: CPT | Performed by: PHYSICAL THERAPIST

## 2022-03-01 PROCEDURE — 97110 THERAPEUTIC EXERCISES: CPT | Performed by: PHYSICAL THERAPIST

## 2022-03-01 NOTE — PROGRESS NOTES
Physical Therapy Daily Progress Note      Patient: Kemal Dumont   : 1975  Diagnosis/ICD-10 Code:  Right ankle pain, unspecified chronicity [M25.571]   Problems Addressed this Visit     None      Visit Diagnoses     Right ankle pain, unspecified chronicity    -  Primary    Ankle instability, right          Diagnoses       Codes Comments    Right ankle pain, unspecified chronicity    -  Primary ICD-10-CM: M25.571  ICD-9-CM: 719.47     Ankle instability, right     ICD-10-CM: M25.371  ICD-9-CM: 718.87          Referring practitioner: CARIDAD Choi  Date of Initial Visit: Type: THERAPY  Noted: 2022  Today's Date: 3/1/2022    VISIT#: 2    Subjective Patient reports feeling better with decreased tenderness to ankle. Heel lift has really helped and is pleased with progress.       Objective added wobble board, SLS, single step up on foam     See Exercise, Manual, and Modality Logs for complete treatment.     Assessment/Plan Patient tolerated progressed therapeutic exercise well with no reports of increased symptoms. Patient making gradual gains towards goals at this time.   Goals  Plan Goals: STG to be met in 3 wk:  - Increase R ankle DF AROM to 5 deg.  - Pt to perform R SLS x25 sec.  - Pt to be independent with HEP.  LTG to be met in 12 wk:  - Improve FAAM score to 95% or better.  - Increase R ankle strength to 5/5 in all directions for improve ability to walk on golf course.  - Pt to negotiate stairs with reciprocal pattern to use all areas of his home.  - Increase R ankle DF to 10 deg    Progress per Plan of Care and Progress strengthening /stabilization /functional activity         Timed:         Manual Therapy:    10     mins  79239;     Therapeutic Exercise:    20     mins  03322;     Neuromuscular Scotty:        mins  08385;    Therapeutic Activity:          mins  17066;     Gait Training:           mins  72885;     Ultrasound:          mins  98822;    Ionto                                  mins    40267  Self Care                    _____  mins   77952  Canalith Repos                   mins  11455    Un-Timed:  Electrical Stimulation:         mins  11627 ( );  Dry Needling          mins self-pay   Traction          mins 45570    Timed Treatment:   30   mins   Total Treatment:     30   mins    Fredo Quispe PTA  [place] License [id number]  Physical Therapist Assistant

## 2022-03-30 ENCOUNTER — TELEPHONE (OUTPATIENT)
Dept: FAMILY MEDICINE CLINIC | Facility: CLINIC | Age: 47
End: 2022-03-30

## 2022-03-30 NOTE — TELEPHONE ENCOUNTER
LVM   Letting patient know that we did not have any open appt for this week at the time.   He should go to urgent care or ED if need before 4/5/22

## 2022-03-30 NOTE — TELEPHONE ENCOUNTER
Hub staff attempted to follow warm transfer process and was unsuccessful     Caller: Kemal Dumont    Relationship to patient: Self    Best call back number: [phone] (H)    Patient is needing: PATIENT REQUESTED SAME DAY APPOINTMENT HOWEVER THERE IS NOTHING AVAILABLE FOR THE HUB TO SCHEDULE, WARM TRANSFER WAS UNSUCCESSFUL, PATIENT REQUESTED FIRST AVAILABLE TO BE SCHEDULED ON 04/05/2022 WHILE WAITING FOR A CALL BACK, PLEASE CALL PATIENT BACK ASAP REGARDING SCHEDULING

## 2022-04-05 ENCOUNTER — OFFICE VISIT (OUTPATIENT)
Dept: FAMILY MEDICINE CLINIC | Facility: CLINIC | Age: 47
End: 2022-04-05

## 2022-04-05 ENCOUNTER — TELEPHONE (OUTPATIENT)
Dept: FAMILY MEDICINE CLINIC | Facility: CLINIC | Age: 47
End: 2022-04-05

## 2022-04-05 VITALS
DIASTOLIC BLOOD PRESSURE: 94 MMHG | HEART RATE: 90 BPM | OXYGEN SATURATION: 98 % | WEIGHT: 311 LBS | HEIGHT: 71 IN | RESPIRATION RATE: 16 BRPM | BODY MASS INDEX: 43.54 KG/M2 | SYSTOLIC BLOOD PRESSURE: 178 MMHG | TEMPERATURE: 98.5 F

## 2022-04-05 DIAGNOSIS — E66.01 MORBIDLY OBESE: ICD-10-CM

## 2022-04-05 DIAGNOSIS — I10 PRIMARY HYPERTENSION: ICD-10-CM

## 2022-04-05 DIAGNOSIS — R51.9 SEVERE HEADACHE: Primary | ICD-10-CM

## 2022-04-05 PROCEDURE — 99214 OFFICE O/P EST MOD 30 MIN: CPT | Performed by: NURSE PRACTITIONER

## 2022-04-05 RX ORDER — BACLOFEN 10 MG/1
10 TABLET ORAL
Qty: 30 TABLET | Refills: 1 | Status: SHIPPED | OUTPATIENT
Start: 2022-04-05 | End: 2022-06-29

## 2022-04-05 RX ORDER — METOPROLOL SUCCINATE 25 MG/1
25 TABLET, EXTENDED RELEASE ORAL DAILY
Qty: 30 TABLET | Refills: 1 | Status: SHIPPED | OUTPATIENT
Start: 2022-04-05 | End: 2022-06-13

## 2022-04-05 NOTE — TELEPHONE ENCOUNTER
Patient forgot to ask you if he could get a refill on his muscle relaxer so he can sleep at night.

## 2022-04-05 NOTE — ASSESSMENT & PLAN NOTE
Hypertension is worsening.  Medication changes per orders.  Blood pressure will be reassessed in 4 weeks.  Patient stop valsartan 3 weeks before coming.  Started on Toprol XL 25 mg daily.  Follow-up in 4 weeks.

## 2022-04-05 NOTE — PROGRESS NOTES
Chief Complaint  Headache    Subjective          Kemal Dumont presents to [facility] for headache, hypertension    History of Present Illness    Patient is here with a history of a 3-week temporal headache.  He reports that he does feel better occasionally when he takes an old muscle relaxer he had.  He has had an episode before we had serial headaches but after using a muscle relaxer went away.  He started having a cough with valsartan he believes.  Previously had an ACE cough and had to be taken off that.  3 weeks ago when his head was hurting and he was coughing he stopped the valsartan.  He worried about his headache being his blood pressure but reports it happened before he stopped the blood pressure medication.  He does have some sinus problem and is not having anything unusual now.  He had sinus infections in the past but reports this feels different.  He has had no head injury.  Patient reports he is on extreme amount of stress.  Patient also has only taken the Wellbutrin once a day.  He has discontinued his trazodone as well.      Kemal Dumont  has a past medical history of Allergic rhinitis, Anserine bursitis (l), Depression, History of tobacco use, Hyperlipidemia, Hypertension, Left shoulder strain, and VT (ventricular tachycardia) ([facility], [facility]) (1/13/2020).      Review of Systems   Constitutional: Positive for fatigue and unexpected weight change. Negative for fever.   Respiratory: Negative for cough and shortness of breath.    Cardiovascular: Negative for chest pain and palpitations.   Neurological: Positive for headaches. Negative for dizziness and numbness.        Objective       Current Outpatient Medications:   •  amLODIPine (NORVASC) 10 MG tablet, Take 1 tablet by mouth Daily., Disp: 90 tablet, Rfl: 3  •  aspirin (aspirin) 81 MG EC tablet, Take 1 tablet by mouth Daily., Disp: 90 tablet, Rfl: 3  •  atorvastatin (LIPITOR) 40 MG tablet, Take 1 tablet by mouth  "Daily., Disp: 90 tablet, Rfl: 3  •  baclofen (LIORESAL) 10 MG tablet, TAKE ONE TABLET BY MOUTH EVERY NIGHT, Disp: 30 tablet, Rfl: 1  •  buPROPion SR (WELLBUTRIN SR) 150 MG 12 hr tablet, Take 1 tablet by mouth 2 (Two) Times a Day. (Patient taking differently: Take 150 mg by mouth 1 (One) Time.), Disp: 180 tablet, Rfl: 3  •  clopidogrel (Plavix) 75 MG tablet, Take 1 tablet by mouth Daily., Disp: 90 tablet, Rfl: 3  •  metoprolol succinate XL (Toprol XL) 25 MG 24 hr tablet, Take 1 tablet by mouth Daily., Disp: 30 tablet, Rfl: 1  •  traZODone (DESYREL) 50 MG tablet, Take 1 tablet by mouth every night at bedtime., Disp: 90 tablet, Rfl: 3  •  zolpidem (Ambien) 5 MG tablet, Take 1 tablet by mouth At Night As Needed for Sleep., Disp: 1 tablet, Rfl: 0    Vital Signs:      /94   Pulse 90   Temp 98.5 °F (36.9 °C) (Infrared)   Resp 16   Ht 180.3 cm (71\")   Wt (!) 141 kg (311 lb)   SpO2 98%   BMI 43.38 kg/m²     Vitals:    04/05/22 1414 04/05/22 1502   BP: 144/93 178/94   BP Location: Left arm    Patient Position: Sitting    Cuff Size: Large Adult    Pulse: 90    Resp: 16    Temp: 98.5 °F (36.9 °C)    TempSrc: Infrared    SpO2: 98%    Weight: (!) 141 kg (311 lb)    Height: 180.3 cm (71\")       Physical Exam  Vitals and nursing note reviewed.   Constitutional:       General: He is not in acute distress.     Appearance: Normal appearance. He is well-developed and normal weight.   HENT:      Head: Normocephalic and atraumatic.      Right Ear: Tympanic membrane, ear canal and external ear normal.      Left Ear: Tympanic membrane, ear canal and external ear normal.      Nose: Nose normal.      Mouth/Throat:      Mouth: Mucous membranes are moist.      Dentition: Normal dentition.      Tongue: No lesions.      Pharynx: Uvula midline.   Eyes:      Conjunctiva/sclera: Conjunctivae normal.      Pupils: Pupils are equal, round, and reactive to light.   Cardiovascular:      Rate and Rhythm: Regular rhythm.      Heart sounds: " Normal heart sounds. No murmur heard.    No friction rub. No gallop.   Pulmonary:      Effort: Pulmonary effort is normal.      Breath sounds: Normal breath sounds. No wheezing or rhonchi.   Abdominal:      General: Bowel sounds are normal. There is no distension.      Palpations: Abdomen is soft.      Tenderness: There is no abdominal tenderness.   Musculoskeletal:         General: Normal range of motion.      Cervical back: Normal range of motion and neck supple.   Skin:     General: Skin is warm and dry.   Neurological:      General: No focal deficit present.      Mental Status: He is alert.      Cranial Nerves: No cranial nerve deficit.      Sensory: No sensory deficit.      Motor: No weakness.   Psychiatric:         Mood and Affect: Mood normal.         Behavior: Behavior normal.        Result Review :                PHQ-9 Depression Screening  Little interest or pleasure in doing things?     Feeling down, depressed, or hopeless?     Trouble falling or staying asleep, or sleeping too much?     Feeling tired or having little energy?     Poor appetite or overeating?     Feeling bad about yourself - or that you are a failure or have let yourself or your family down?     Trouble concentrating on things, such as reading the newspaper or watching television?     Moving or speaking so slowly that other people could have noticed? Or the opposite - being so fidgety or restless that you have been moving around a lot more than usual?     Thoughts that you would be better off dead, or of hurting yourself in some way?     PHQ-9 Total Score     If you checked off any problems, how difficult have these problems made it for you to do your work, take care of things at home, or get along with other people?             Assessment and Plan    Diagnoses and all orders for this visit:    1. Severe headache (Primary)  Comments:  Will do CRP today.  Discussed a CT scan.  If any increasing symptoms to go to ED.  Also discussing started  blood pressure medicine.  Orders:  -     Comprehensive Metabolic Panel  -     C-reactive Protein  -     CBC & Differential  -     CT Head With & Without Contrast; Future    2. Primary hypertension  Assessment & Plan:  Hypertension is worsening.  Medication changes per orders.  Blood pressure will be reassessed in 4 weeks.  Patient stop valsartan 3 weeks before coming.  Started on Toprol XL 25 mg daily.  Follow-up in 4 weeks.      3. Morbidly obese (HCC)  Comments:  Worried about weight gain today discussed addressing headache and blood pressure in then starting to address that issue    Other orders  -     metoprolol succinate XL (Toprol XL) 25 MG 24 hr tablet; Take 1 tablet by mouth Daily.  Dispense: 30 tablet; Refill: 1       Problem List Items Addressed This Visit        Active Problems    Hypertension    Current Assessment & Plan     Hypertension is worsening.  Medication changes per orders.  Blood pressure will be reassessed in 4 weeks.  Patient stop valsartan 3 weeks before coming.  Started on Toprol XL 25 mg daily.  Follow-up in 4 weeks.           Relevant Medications    metoprolol succinate XL (Toprol XL) 25 MG 24 hr tablet    Morbidly obese (HCC)      Other Visit Diagnoses     Severe headache    -  Primary    Will do CRP today.  Discussed a CT scan.  If any increasing symptoms to go to ED.  Also discussing started blood pressure medicine.    Relevant Orders    Comprehensive Metabolic Panel    C-reactive Protein    CBC & Differential    CT Head With & Without Contrast          Follow Up   Return in about 4 weeks (around 5/3/2022) for Recheck htn and headache.  Patient was given instructions and counseling regarding his condition or for health maintenance advice. Please see specific information pulled into the AVS if appropriate.

## 2022-04-12 DIAGNOSIS — R51.9 SEVERE HEADACHE: ICD-10-CM

## 2022-06-13 RX ORDER — METOPROLOL SUCCINATE 25 MG/1
TABLET, EXTENDED RELEASE ORAL
Qty: 30 TABLET | Refills: 0 | Status: SHIPPED | OUTPATIENT
Start: 2022-06-13 | End: 2023-03-30

## 2022-06-13 NOTE — TELEPHONE ENCOUNTER
LVM    Letting patient know that medication was refilled for 1 month and that he will need to make an appointment before getting the another refill.   hub to read

## 2022-06-24 ENCOUNTER — TELEPHONE (OUTPATIENT)
Dept: FAMILY MEDICINE CLINIC | Facility: CLINIC | Age: 47
End: 2022-06-24

## 2022-06-24 NOTE — TELEPHONE ENCOUNTER
Left Vm instructing patient to get blood work done over at [facility] and make sure to be fasting prior to having the labs drawn. HUB TO READ

## 2022-06-29 ENCOUNTER — OFFICE VISIT (OUTPATIENT)
Dept: CARDIOLOGY | Facility: CLINIC | Age: 47
End: 2022-06-29

## 2022-06-29 VITALS
WEIGHT: 311 LBS | DIASTOLIC BLOOD PRESSURE: 108 MMHG | SYSTOLIC BLOOD PRESSURE: 156 MMHG | HEART RATE: 104 BPM | BODY MASS INDEX: 43.54 KG/M2 | HEIGHT: 71 IN | OXYGEN SATURATION: 96 %

## 2022-06-29 DIAGNOSIS — I77.89 CORONARY ARTERY ECTASIA: Primary | ICD-10-CM

## 2022-06-29 DIAGNOSIS — I10 PRIMARY HYPERTENSION: ICD-10-CM

## 2022-06-29 DIAGNOSIS — E78.2 MIXED HYPERLIPIDEMIA: ICD-10-CM

## 2022-06-29 LAB
ALBUMIN SERPL-MCNC: 4.6 G/DL (ref 4–5)
ALBUMIN/GLOB SERPL: 1.6 {RATIO} (ref 1.2–2.2)
ALP SERPL-CCNC: 64 IU/L (ref 44–121)
ALT SERPL-CCNC: 101 IU/L (ref 0–44)
AST SERPL-CCNC: 47 IU/L (ref 0–40)
BASOPHILS # BLD AUTO: 0.1 X10E3/UL (ref 0–0.2)
BASOPHILS NFR BLD AUTO: 1 %
BILIRUB SERPL-MCNC: 0.7 MG/DL (ref 0–1.2)
BUN SERPL-MCNC: 17 MG/DL (ref 6–24)
BUN/CREAT SERPL: 17 (ref 9–20)
CALCIUM SERPL-MCNC: 10.1 MG/DL (ref 8.7–10.2)
CHLORIDE SERPL-SCNC: 99 MMOL/L (ref 96–106)
CO2 SERPL-SCNC: 27 MMOL/L (ref 20–29)
CREAT SERPL-MCNC: 1.01 MG/DL (ref 0.76–1.27)
CRP SERPL-MCNC: 6 MG/L (ref 0–10)
EGFRCR SERPLBLD CKD-EPI 2021: 93 ML/MIN/1.73
EOSINOPHIL # BLD AUTO: 0.3 X10E3/UL (ref 0–0.4)
EOSINOPHIL NFR BLD AUTO: 4 %
ERYTHROCYTE [DISTWIDTH] IN BLOOD BY AUTOMATED COUNT: 14.2 % (ref 11.6–15.4)
GLOBULIN SER CALC-MCNC: 2.8 G/DL (ref 1.5–4.5)
GLUCOSE SERPL-MCNC: 115 MG/DL (ref 65–99)
HCT VFR BLD AUTO: 54.2 % (ref 37.5–51)
HGB BLD-MCNC: 17.8 G/DL (ref 13–17.7)
IMM GRANULOCYTES # BLD AUTO: 0 X10E3/UL (ref 0–0.1)
IMM GRANULOCYTES NFR BLD AUTO: 1 %
LYMPHOCYTES # BLD AUTO: 2.6 X10E3/UL (ref 0.7–3.1)
LYMPHOCYTES NFR BLD AUTO: 33 %
MCH RBC QN AUTO: 29 PG (ref 26.6–33)
MCHC RBC AUTO-ENTMCNC: 32.8 G/DL (ref 31.5–35.7)
MCV RBC AUTO: 88 FL (ref 79–97)
MONOCYTES # BLD AUTO: 0.6 X10E3/UL (ref 0.1–0.9)
MONOCYTES NFR BLD AUTO: 8 %
NEUTROPHILS # BLD AUTO: 4.1 X10E3/UL (ref 1.4–7)
NEUTROPHILS NFR BLD AUTO: 53 %
PLATELET # BLD AUTO: 251 X10E3/UL (ref 150–450)
POTASSIUM SERPL-SCNC: 4.3 MMOL/L (ref 3.5–5.2)
PROT SERPL-MCNC: 7.4 G/DL (ref 6–8.5)
RBC # BLD AUTO: 6.13 X10E6/UL (ref 4.14–5.8)
SODIUM SERPL-SCNC: 141 MMOL/L (ref 134–144)
WBC # BLD AUTO: 7.7 X10E3/UL (ref 3.4–10.8)

## 2022-06-29 PROCEDURE — 93000 ELECTROCARDIOGRAM COMPLETE: CPT | Performed by: INTERNAL MEDICINE

## 2022-06-29 PROCEDURE — 99214 OFFICE O/P EST MOD 30 MIN: CPT | Performed by: INTERNAL MEDICINE

## 2022-06-29 RX ORDER — MULTIPLE VITAMINS W/ MINERALS TAB 9MG-400MCG
1 TAB ORAL DAILY
COMMUNITY

## 2022-06-29 RX ORDER — MULTIVIT WITH MINERALS/LUTEIN
250 TABLET ORAL DAILY
COMMUNITY
End: 2023-03-30

## 2022-06-29 RX ORDER — CHLORAL HYDRATE 500 MG
CAPSULE ORAL
COMMUNITY

## 2022-06-29 RX ORDER — MELATONIN
1000 DAILY
COMMUNITY
End: 2023-03-30

## 2022-07-05 ENCOUNTER — TELEPHONE (OUTPATIENT)
Dept: FAMILY MEDICINE CLINIC | Facility: CLINIC | Age: 47
End: 2022-07-05

## 2022-07-05 ENCOUNTER — PATIENT MESSAGE (OUTPATIENT)
Dept: FAMILY MEDICINE CLINIC | Facility: CLINIC | Age: 47
End: 2022-07-05

## 2022-07-05 NOTE — TELEPHONE ENCOUNTER
----- Message from Delmis Mcfadden MA sent at 7/5/2022  9:14 AM EDT -----  Regarding: FW: recent blood work  Advise?  ----- Message -----  From: Kemal Dumont  Sent: 7/5/2022   9:12 AM EDT  To: Eneida Guerra [facility]  Subject: recent blood work                                can you please re-submit a new request for more of the same blood work that i just did… the numbers are not accurate because i was not fasting… now that i know i would like to go back & do this again while not having eaten… please let me know… thank you!

## 2022-07-05 NOTE — TELEPHONE ENCOUNTER
Please inform patient that he if he was not fasting we do not need to repeat the blood sugar it would be normal for a nonfasting blood sugar.  His cholesterol was not done.  So we do not have to prevent that.    C-reactive protein which is for inflammation was normal.  He still has a mild elevation in his hemoglobin and hematocrit.  We can repeat that and his lipids fasting in 6 or 8 weeks.    My biggest concern is a blood pressure still being elevated being controlled.  That is why I suggested a follow-up visit.

## 2022-08-01 ENCOUNTER — TELEPHONE (OUTPATIENT)
Dept: CARDIOLOGY | Facility: CLINIC | Age: 47
End: 2022-08-01

## 2022-08-01 DIAGNOSIS — I10 PRIMARY HYPERTENSION: ICD-10-CM

## 2022-08-01 DIAGNOSIS — I77.89 CORONARY ARTERY ECTASIA: Primary | ICD-10-CM

## 2022-08-01 RX ORDER — VALSARTAN AND HYDROCHLOROTHIAZIDE 320; 25 MG/1; MG/1
1 TABLET, FILM COATED ORAL DAILY
Qty: 30 TABLET | Refills: 1 | Status: SHIPPED | OUTPATIENT
Start: 2022-08-01 | End: 2022-11-11

## 2022-08-01 NOTE — TELEPHONE ENCOUNTER
----- Message from Micheal Mejias DO sent at 8/1/2022 11:02 AM EDT -----  Regarding: RE: home BP readings  Lets increase the dose of the valsartan hctz to 320/25. Send him over a new Rx to [facility] and have him get a BMP 1 week after the increased dose      ----- Message -----  From: Maxine Aponte MA  Sent: 8/1/2022  10:10 AM EDT  To: Micheal Mejias DO  Subject: home BP readings                                 I have the BP readings printed for you to look at.     ·my current medication is Valsartan & Hydrochlorothiazide 160mg / 25mg in the morning & Amlodipine Besylate 10mg before bed     ·i have purchased a good quality bp monitor & have been consistent monitoring my bp... my average bp over the last 30 days is 140/99... my numbers are always highest in the morning... typically a little better in the evening... but the only time they ever drop into a normal healthy range is after i work out     ·i am working hard to lose weight & control my diet... i definitely don't want to go on ANOTHER medication if it could cause more weight gain... but i also know i need to get this bp under control... so i'm open to adding or changing prescriptions if that's the best course of action... i just don't want anything that could add on extra weight     ·the goal is to continue to exercise & work on diet & lifestyle until hopefully one day i won't need any of these medications... but until then i know i need to do whatever i can to control this so i'll await your response      ·if you give me an email address i can share my bp data with you or your staff... please let me know what you decide on the medication... [facility] in [place] is still my pharmacy... thank you!!

## 2022-09-26 ENCOUNTER — DOCUMENTATION (OUTPATIENT)
Dept: PHYSICAL THERAPY | Facility: CLINIC | Age: 47
End: 2022-09-26

## 2022-09-26 DIAGNOSIS — M25.571 RIGHT ANKLE PAIN, UNSPECIFIED CHRONICITY: Primary | ICD-10-CM

## 2022-09-26 DIAGNOSIS — M25.371 ANKLE INSTABILITY, RIGHT: ICD-10-CM

## 2022-09-26 NOTE — PROGRESS NOTES
Discharge Summary  Discharge Summary from Physical Therapy Report      Dates  PT visit: 2/21/2022 - 3/1/2022  Number of Visits: 2     Discharge Status of Patient: See Progress Note dated 3/1/2022    Goals: Not Met    Discharge Plan: Continue with current home exercise program as instructed    Comments : additional visits were cancelled    Date of Discharge 9/26/2022        Amparo Lane, PT  Physical Therapist  [place] Lic# [id number]

## 2022-11-11 RX ORDER — VALSARTAN AND HYDROCHLOROTHIAZIDE 320; 25 MG/1; MG/1
TABLET, FILM COATED ORAL
Qty: 90 TABLET | Refills: 1 | Status: SHIPPED | OUTPATIENT
Start: 2022-11-11

## 2022-12-02 DIAGNOSIS — I10 ESSENTIAL HYPERTENSION: ICD-10-CM

## 2022-12-02 RX ORDER — AMLODIPINE BESYLATE 10 MG/1
TABLET ORAL
Qty: 90 TABLET | Refills: 1 | Status: SHIPPED | OUTPATIENT
Start: 2022-12-02 | End: 2023-03-30 | Stop reason: SDUPTHER

## 2022-12-03 DIAGNOSIS — I10 ESSENTIAL HYPERTENSION: ICD-10-CM

## 2022-12-05 RX ORDER — AMLODIPINE BESYLATE 10 MG/1
TABLET ORAL
Qty: 90 TABLET | Refills: 3 | OUTPATIENT
Start: 2022-12-05

## 2023-03-23 LAB
AMBIG ABBREV BMP8 DEFAULT: NORMAL
AMBIG ABBREV LP DEFAULT: NORMAL
BUN SERPL-MCNC: 16 MG/DL (ref 6–24)
BUN/CREAT SERPL: 16 (ref 9–20)
CALCIUM SERPL-MCNC: 9.5 MG/DL (ref 8.7–10.2)
CHLORIDE SERPL-SCNC: 103 MMOL/L (ref 96–106)
CHOLEST SERPL-MCNC: 254 MG/DL (ref 100–199)
CO2 SERPL-SCNC: 25 MMOL/L (ref 20–29)
CREAT SERPL-MCNC: 1.01 MG/DL (ref 0.76–1.27)
EGFRCR SERPLBLD CKD-EPI 2021: 92 ML/MIN/1.73
GLUCOSE SERPL-MCNC: 103 MG/DL (ref 70–99)
HDLC SERPL-MCNC: 39 MG/DL
LDLC SERPL CALC-MCNC: 178 MG/DL (ref 0–99)
POTASSIUM SERPL-SCNC: 4.3 MMOL/L (ref 3.5–5.2)
SODIUM SERPL-SCNC: 142 MMOL/L (ref 134–144)
TRIGL SERPL-MCNC: 198 MG/DL (ref 0–149)
VLDLC SERPL CALC-MCNC: 37 MG/DL (ref 5–40)

## 2023-03-30 ENCOUNTER — OFFICE VISIT (OUTPATIENT)
Dept: CARDIOLOGY | Facility: CLINIC | Age: 48
End: 2023-03-30
Payer: COMMERCIAL

## 2023-03-30 VITALS
HEART RATE: 76 BPM | BODY MASS INDEX: 42.42 KG/M2 | WEIGHT: 303 LBS | SYSTOLIC BLOOD PRESSURE: 161 MMHG | DIASTOLIC BLOOD PRESSURE: 108 MMHG | OXYGEN SATURATION: 97 % | HEIGHT: 71 IN

## 2023-03-30 DIAGNOSIS — I10 ESSENTIAL HYPERTENSION: ICD-10-CM

## 2023-03-30 DIAGNOSIS — I10 PRIMARY HYPERTENSION: ICD-10-CM

## 2023-03-30 DIAGNOSIS — R79.89 ABNORMAL LFTS: ICD-10-CM

## 2023-03-30 DIAGNOSIS — I77.89 CORONARY ARTERY ECTASIA: Primary | ICD-10-CM

## 2023-03-30 DIAGNOSIS — E78.2 MIXED HYPERLIPIDEMIA: ICD-10-CM

## 2023-03-30 PROCEDURE — 93000 ELECTROCARDIOGRAM COMPLETE: CPT | Performed by: INTERNAL MEDICINE

## 2023-03-30 PROCEDURE — 99214 OFFICE O/P EST MOD 30 MIN: CPT | Performed by: INTERNAL MEDICINE

## 2023-03-30 RX ORDER — AMLODIPINE BESYLATE 10 MG/1
10 TABLET ORAL DAILY
Qty: 90 TABLET | Refills: 3 | Status: SHIPPED | OUTPATIENT
Start: 2023-03-30

## 2023-03-30 RX ORDER — CLOPIDOGREL BISULFATE 75 MG/1
75 TABLET ORAL DAILY
Qty: 90 TABLET | Refills: 3 | Status: SHIPPED | OUTPATIENT
Start: 2023-03-30

## 2023-03-30 NOTE — PROGRESS NOTES
Cardiology Office Visit      Encounter Date:  03/30/2023    Patient ID:   Kemal Dumont is a 47 y.o. male.    Reason For Followup:  Coronary ectasia    Brief Clinical History:  Dear Alfie Kraft PA-C    I had the pleasure of seeing Kemal Dumont today. As you are well aware, this is a 47 y.o. male with no established history of ischemic heart disease.  He does have a history of coronary ectasia.  He underwent cardiac catheterization in January 2020 secondary to an abnormal treadmill with nonsustained ventricular tachycardia.  He was found to have significant coronary ectasia involving the RCA, LAD, and circumflex vessels.  He has additional history that includes hypertension, hyperlipidemia, and morbid obesity.  He presents today for follow-up on the above conditions.    Interval History:  He denies any chest pain pressure heaviness or tightness.  He denies any shortness of breath out of character.  He denies any PND orthopnea.  He denies any syncope or near syncope.  He reports feeling well from a cardiac perspective.    The patient reports that he had some issues with his liver in the past and he wound up coming off of most of his medications.  He is taking amlodipine but does not feel like it is making much of a difference with his blood pressure.    He has tried to make some significant healthy lifestyle changes.  He lost a significant amount of weight but has gained a little bit of it back.    If you will recall, the patient underwent a cardiac catheterization in January 2020.  This was secondary to nonsustained ventricular tachycardia revealed on stress testing.  No angiographic occlusive disease was noted however he did have significant coronary ectasia which prompted the utilization of antiplatelet therapy.    He expresses 3 main concerns today.  His first concern is with regard to his blood pressure.  He notes that the amlodipine is not working very well nor is the valsartan.  We discussed  "options and we will try him on samples of Edarbi to see if this helps.    His next concern involves his liver.  Ideally we would like to have him on a cholesterol lowering medication however because of his history of liver issues I am hesitant to initiate therapy.  As such we will have him follow-up with gastroenterology for their recommendations.  It may be that statins are inappropriate for him.  If that is the case, we can use Repatha or Praluent.    His last concern involves his fasting glucose levels.  I will have him discuss management of this with you.    Assessment & Plan    Impressions:  Severe coronary ectasia  Nonsustained ventricular tachycardia on treadmill stress test with no evidence of occlusive disease on catheterization January 2020  Antiplatelet therapy  Hyperlipidemia  Hypertension  Adverse reaction to ACE inhibitors-cough    Recommendations:  Discontinue valsartan  Samples of Edarbi 80 mg daily  Gastroenterology referral for recommendations on statin therapy  Follow-up in 8 weeks time    Diagnoses and all orders for this visit:    1. Coronary artery ectasia (Primary)  -     Ambulatory Referral to Gastroenterology  -     ECG 12 Lead    2. Primary hypertension  -     Ambulatory Referral to Gastroenterology  -     ECG 12 Lead    3. Mixed hyperlipidemia  -     Ambulatory Referral to Gastroenterology  -     ECG 12 Lead    4. Abnormal LFTs  -     Ambulatory Referral to Gastroenterology  -     ECG 12 Lead    5. Essential hypertension  -     amLODIPine (NORVASC) 10 MG tablet; Take 1 tablet by mouth Daily.  Dispense: 90 tablet; Refill: 3  -     ECG 12 Lead    Other orders  -     clopidogrel (Plavix) 75 MG tablet; Take 1 tablet by mouth Daily.  Dispense: 90 tablet; Refill: 3          Objective:    Vitals:  Vitals:    03/30/23 0829   BP: (!) 161/108   Pulse: 76   SpO2: 97%   Weight: (!) 137 kg (303 lb)   Height: 180.3 cm (71\")     Body mass index is 42.26 kg/m².      Physical Exam:    General: Alert, " cooperative, no distress, appears stated age  Head:  Normocephalic, atraumatic, mucous membranes moist  Eyes:  Conjunctiva/corneas clear, EOM's intact     Neck:  Supple,  no bruit    Lungs: Clear to auscultation bilaterally, no wheezes rhonchi rales are noted  Chest wall: No tenderness  Heart::  Regular rate and rhythm, S1 and S2 normal, 1/6 holosystolic murmur.  No rub or gallop  Abdomen: Soft, non-tender, nondistended bowel sounds active.  Obese  Extremities: No cyanosis, clubbing, or edema  Pulses: 2+ and symmetric all extremities  Skin:  No rashes or lesions  Neuro/psych: A&O x3. CN II through XII are grossly intact with appropriate affect      Allergies:  Allergies   Allergen Reactions   • Ace Inhibitors Cough   • Valsartan Cough       Medication Review:     Current Outpatient Medications:   •  amLODIPine (NORVASC) 10 MG tablet, Take 1 tablet by mouth Daily., Disp: 90 tablet, Rfl: 3  •  aspirin (aspirin) 81 MG EC tablet, Take 1 tablet by mouth Daily., Disp: 90 tablet, Rfl: 3  •  clopidogrel (Plavix) 75 MG tablet, Take 1 tablet by mouth Daily., Disp: 90 tablet, Rfl: 3  •  MAGNESIUM PO, Take  by mouth. At bedtime for sleep, Disp: , Rfl:   •  multivitamin with minerals tablet tablet, Take 1 tablet by mouth Daily., Disp: , Rfl:   •  Omega-3 Fatty Acids (fish oil) 1000 MG capsule capsule, Take  by mouth Daily With Breakfast., Disp: , Rfl:   •  valsartan-hydrochlorothiazide (DIOVAN-HCT) 320-25 MG per tablet, TAKE ONE TABLET BY MOUTH ONCE DAILY, Disp: 90 tablet, Rfl: 1    Family History:  Family History   Problem Relation Age of Onset   • Diabetes Father    • Hypertension Father    • Hyperlipidemia Father    • Other Father         lung/respiratory disease    • Stroke Father    • Colon polyps Father    • Diabetes Maternal Grandmother    • Lung cancer Maternal Grandfather    • Liver cancer Maternal Grandfather    • Heart disease Paternal Grandfather    • Colon cancer Maternal Uncle        Past Medical History:  Past  Medical History:   Diagnosis Date   • Allergic rhinitis    • Anserine bursitis l    left   • Depression    • History of tobacco use    • Hyperlipidemia    • Hypertension    • Left shoulder strain    • VT (ventricular tachycardia) 01/13/2020    Added automatically from request for surgery [id number]       Past Surgical History:  Past Surgical History:   Procedure Laterality Date   • ANKLE ARTHROSCOPY Right 11/2008   • CARDIAC CATHETERIZATION N/A 01/14/2020    Procedure: Left Heart Cath;  Surgeon: Micheal Mejias DO;  Location: [facility] CATH INVASIVE LOCATION;  Service: Cardiology   • COLONOSCOPY     • TONSILLECTOMY         Social History:  Social History     Socioeconomic History   • Marital status:    Tobacco Use   • Smoking status: Never   • Smokeless tobacco: Never   • Tobacco comments:     socially while in high school    Vaping Use   • Vaping Use: Never used   Substance and Sexual Activity   • Alcohol use: Yes     Alcohol/week: 8.0 standard drinks     Types: 8 Glasses of wine per week     Comment: 3 x wk / [place], wine, occasional beer    • Drug use: Never     Types: Marijuana   • Sexual activity: Yes     Partners: Female     Birth control/protection: Surgical       Review of Systems:  The following systems were reviewed as they relate to the cardiovascular system: Constitutional, Eyes, ENT, Cardiovascular, Respiratory, Gastrointestinal, Integumentary, Neurological, Psychiatric, Hematologic, Endocrine, Musculoskeletal, and Genitourinary. The pertinent cardiovascular findings are reported above with all other cardiovascular points within those systems being negative.    Diagnostic Study Review:     Current Electrocardiogram:    ECG 12 Lead    Date/Time: 3/30/2023 12:59 PM  Performed by: Micheal Mejias DO  Authorized by: Micheal Mejias DO   Comparison: compared with previous ECG from 6/29/2022  Similar to previous ECG  Comments: Normal sinus rhythm with a ventricular  rate of 76 bpm.  Left atrial enlargement.  Old inferior MI.  Normal QT and QTc intervals.  Nonspecific repolarization changes.  Normal QRS axis.            Laboratory Data:  Lab Results   Component Value Date    GLUCOSE 103 (H) 03/22/2023    BUN 16 03/22/2023    CREATININE 1.01 03/22/2023    EGFRIFNONA 93 08/10/2021    EGFRIFAFRI 107 08/10/2021    BCR 16 03/22/2023    K 4.3 03/22/2023    CO2 25 03/22/2023    CALCIUM 9.5 03/22/2023    PROTENTOTREF 7.4 06/28/2022    ALBUMIN 4.6 06/28/2022    LABIL2 1.6 06/28/2022    AST 47 (H) 06/28/2022     (H) 06/28/2022     Lab Results   Component Value Date    GLUCOSE 103 (H) 03/22/2023    CALCIUM 9.5 03/22/2023     03/22/2023    K 4.3 03/22/2023    CO2 25 03/22/2023     03/22/2023    BUN 16 03/22/2023    CREATININE 1.01 03/22/2023    EGFRIFAFRI 107 08/10/2021    EGFRIFNONA 93 08/10/2021    BCR 16 03/22/2023    ANIONGAP 9.0 01/14/2020     Lab Results   Component Value Date    WBC 7.7 06/28/2022    HGB 17.8 (H) 06/28/2022    HCT 54.2 (H) 06/28/2022    MCV 88 06/28/2022     06/28/2022     Lab Results   Component Value Date    CHOL 170 05/08/2019    CHLPL 254 (H) 03/22/2023    TRIG 198 (H) 03/22/2023    HDL 39 (L) 03/22/2023     (H) 03/22/2023     No results found for: HGBA1C  No results found for: INR, PROTIME    Most Recent Echo:       Most Recent Stress Test:  Results for orders placed during the hospital encounter of 01/13/20    Stress Test With Myocardial Perfusion One Day    Interpretation Summary  · Findings consistent with an abnormal ECG stress test secondary to nonsustained ventricular tachycardia occurring at peak exercise  · No scintigraphic evidence for provokable ischemia  · Fixed inferobasilar defect likely secondary to technical factors  · Left ventricular ejection fraction is normal (Calculated EF = 58%).  · Impressions are consistent with a low risk study.  · There is no prior study available for comparison.  · Cardiac  catheterization recommended secondary to the presence of nonsustained ventricular tachycardia    Stress portion of this exam was supervised by: Najma Rivera NP       Most Recent Cardiac Catheterization:   Results for orders placed during the hospital encounter of 01/13/20    Cardiac Catheterization/Vascular Study    Narrative  Cardiac Catheterization Operative Report    Kemal Dumont  [id number]  1/14/2020  @PCP@    He underwent left heart catheterization with selective coronary angiography, left ventriculography, right common iliac angiography.    Indications for the procedure include: abnormal stress test.    Procedure Details:  The risks, benefits, complications, treatment options, and expected outcomes were discussed with the patient. The patient and/or family concurred with the proposed plan, giving informed consent.    After informed consent the patient was brought to the cath lab after appropriate IV hydration was begun and oral premedication was given. He was further sedated with fentanyl and midazolam. He was prepped and draped in the usual manner. Using the modified Seldinger access technique, a 6 Wolof sheath was placed in the femoral artery. A left heart catheterization with coronary arteriography was performed. Findings are discussed below.    After the procedure was completed, sedation was stopped and the sheaths and catheters were all removed. Hemostasis was achieved per established hospital protocols.    Findings:    Hemodynamics  LVEDP: 6  LV: 100/0  AO: 100/70  Left Main  very large, no significant disease  RCA  very large, ectatic, no occlusive disease  LAD  very large, ectatic no occlusive disease  Circ  very large, ectatic no occlusive disease  SVG(s)  not applicable  ANTONIO  not applicable  LV  normal LV systolic function EF 55%  Coronary Dominance  RCA    Estimated Blood Loss:  Minimal    Complications:  None; patient tolerated the procedure well.    Disposition: PACU - hemodynamically  stable    Condition: stable    Impressions:  No angiographic evidence for significant epicardial occlusive disease  Very large and ectatic coronary arteries with sluggish flow  Normal left ventricular systolic function ejection fraction 55%    Recommendations:  Add Plavix to daily aspirin  Aggressively treat risk factors  Cardiac status otherwise appropriate for discharge after bedrest.  Follow-up 2 weeks       NOTE: The following portions of the patient's note were reviewed, confirmed and/or updated this visit as appropriate: History of present illness/Interval history, physical examination, assessment & plan, allergies, current medications, past family history, past medical history, past social history, past surgical history and problem list.

## 2023-03-30 NOTE — PATIENT INSTRUCTIONS
Try samples of edarby and see if that works  Follow-up 8 weeks  Dr Brunson referral  Mainor albert

## 2023-03-30 NOTE — LETTER
April 1, 2023     Alfie Bruce PA-C  [address]  [address]  [place] [address]    Patient: Kemal Dumont   YOB: 1975   Date of Visit: 3/30/2023       Dear Dr. Teo PA-C:    Thank you for referring Kemal Dumont to me for evaluation. Below are the relevant portions of my assessment and plan of care.    If you have questions, please do not hesitate to call me. I look forward to following Kemal along with you.         Sincerely,        Micheal Mejias,         CC: MD Magalie Johnson Christopher Shawn, DO  04/01/23 1303  Signed  Cardiology Office Visit      Encounter Date:  03/30/2023    Patient ID:   Kemal Dumont is a 47 y.o. male.    Reason For Followup:  Coronary ectasia    Brief Clinical History:  Dear Alfie Kraft PA-C    I had the pleasure of seeing Kemal Dumont today. As you are well aware, this is a 47 y.o. male with no established history of ischemic heart disease.  He does have a history of coronary ectasia.  He underwent cardiac catheterization in January 2020 secondary to an abnormal treadmill with nonsustained ventricular tachycardia.  He was found to have significant coronary ectasia involving the RCA, LAD, and circumflex vessels.  He has additional history that includes hypertension, hyperlipidemia, and morbid obesity.  He presents today for follow-up on the above conditions.    Interval History:  He denies any chest pain pressure heaviness or tightness.  He denies any shortness of breath out of character.  He denies any PND orthopnea.  He denies any syncope or near syncope.  He reports feeling well from a cardiac perspective.    The patient reports that he had some issues with his liver in the past and he wound up coming off of most of his medications.  He is taking amlodipine but does not feel like it is making much of a difference with his blood pressure.    He has tried to make some significant healthy lifestyle  changes.  He lost a significant amount of weight but has gained a little bit of it back.    If you will recall, the patient underwent a cardiac catheterization in January 2020.  This was secondary to nonsustained ventricular tachycardia revealed on stress testing.  No angiographic occlusive disease was noted however he did have significant coronary ectasia which prompted the utilization of antiplatelet therapy.    He expresses 3 main concerns today.  His first concern is with regard to his blood pressure.  He notes that the amlodipine is not working very well nor is the valsartan.  We discussed options and we will try him on samples of Edarbi to see if this helps.    His next concern involves his liver.  Ideally we would like to have him on a cholesterol lowering medication however because of his history of liver issues I am hesitant to initiate therapy.  As such we will have him follow-up with gastroenterology for their recommendations.  It may be that statins are inappropriate for him.  If that is the case, we can use Repatha or Praluent.    His last concern involves his fasting glucose levels.  I will have him discuss management of this with you.    Assessment & Plan    Impressions:  Severe coronary ectasia  Nonsustained ventricular tachycardia on treadmill stress test with no evidence of occlusive disease on catheterization January 2020  Antiplatelet therapy  Hyperlipidemia  Hypertension  Adverse reaction to ACE inhibitors-cough    Recommendations:  Discontinue valsartan  Samples of Edarbi 80 mg daily  Gastroenterology referral for recommendations on statin therapy  Follow-up in 8 weeks time    Diagnoses and all orders for this visit:    1. Coronary artery ectasia (Primary)  -     Ambulatory Referral to Gastroenterology  -     ECG 12 Lead    2. Primary hypertension  -     Ambulatory Referral to Gastroenterology  -     ECG 12 Lead    3. Mixed hyperlipidemia  -     Ambulatory Referral to Gastroenterology  -      "ECG 12 Lead    4. Abnormal LFTs  -     Ambulatory Referral to Gastroenterology  -     ECG 12 Lead    5. Essential hypertension  -     amLODIPine (NORVASC) 10 MG tablet; Take 1 tablet by mouth Daily.  Dispense: 90 tablet; Refill: 3  -     ECG 12 Lead    Other orders  -     clopidogrel (Plavix) 75 MG tablet; Take 1 tablet by mouth Daily.  Dispense: 90 tablet; Refill: 3          Objective:    Vitals:  Vitals:    03/30/23 0829   BP: (!) 161/108   Pulse: 76   SpO2: 97%   Weight: (!) 137 kg (303 lb)   Height: 180.3 cm (71\")     Body mass index is 42.26 kg/m².      Physical Exam:    General: Alert, cooperative, no distress, appears stated age  Head:  Normocephalic, atraumatic, mucous membranes moist  Eyes:  Conjunctiva/corneas clear, EOM's intact     Neck:  Supple,  no bruit    Lungs: Clear to auscultation bilaterally, no wheezes rhonchi rales are noted  Chest wall: No tenderness  Heart::  Regular rate and rhythm, S1 and S2 normal, 1/6 holosystolic murmur.  No rub or gallop  Abdomen: Soft, non-tender, nondistended bowel sounds active.  Obese  Extremities: No cyanosis, clubbing, or edema  Pulses: 2+ and symmetric all extremities  Skin:  No rashes or lesions  Neuro/psych: A&O x3. CN II through XII are grossly intact with appropriate affect      Allergies:  Allergies   Allergen Reactions   • Ace Inhibitors Cough   • Valsartan Cough       Medication Review:     Current Outpatient Medications:   •  amLODIPine (NORVASC) 10 MG tablet, Take 1 tablet by mouth Daily., Disp: 90 tablet, Rfl: 3  •  aspirin (aspirin) 81 MG EC tablet, Take 1 tablet by mouth Daily., Disp: 90 tablet, Rfl: 3  •  clopidogrel (Plavix) 75 MG tablet, Take 1 tablet by mouth Daily., Disp: 90 tablet, Rfl: 3  •  MAGNESIUM PO, Take  by mouth. At bedtime for sleep, Disp: , Rfl:   •  multivitamin with minerals tablet tablet, Take 1 tablet by mouth Daily., Disp: , Rfl:   •  Omega-3 Fatty Acids (fish oil) 1000 MG capsule capsule, Take  by mouth Daily With Breakfast., " Disp: , Rfl:   •  valsartan-hydrochlorothiazide (DIOVAN-HCT) 320-25 MG per tablet, TAKE ONE TABLET BY MOUTH ONCE DAILY, Disp: 90 tablet, Rfl: 1    Family History:  Family History   Problem Relation Age of Onset   • Diabetes Father    • Hypertension Father    • Hyperlipidemia Father    • Other Father         lung/respiratory disease    • Stroke Father    • Colon polyps Father    • Diabetes Maternal Grandmother    • Lung cancer Maternal Grandfather    • Liver cancer Maternal Grandfather    • Heart disease Paternal Grandfather    • Colon cancer Maternal Uncle        Past Medical History:  Past Medical History:   Diagnosis Date   • Allergic rhinitis    • Anserine bursitis l    left   • Depression    • History of tobacco use    • Hyperlipidemia    • Hypertension    • Left shoulder strain    • VT (ventricular tachycardia) 01/13/2020    Added automatically from request for surgery [id number]       Past Surgical History:  Past Surgical History:   Procedure Laterality Date   • ANKLE ARTHROSCOPY Right 11/2008   • CARDIAC CATHETERIZATION N/A 01/14/2020    Procedure: Left Heart Cath;  Surgeon: Micheal Mejias DO;  Location: [facility] CATH INVASIVE LOCATION;  Service: Cardiology   • COLONOSCOPY     • TONSILLECTOMY         Social History:  Social History     Socioeconomic History   • Marital status:    Tobacco Use   • Smoking status: Never   • Smokeless tobacco: Never   • Tobacco comments:     socially while in high school    Vaping Use   • Vaping Use: Never used   Substance and Sexual Activity   • Alcohol use: Yes     Alcohol/week: 8.0 standard drinks     Types: 8 Glasses of wine per week     Comment: 3 x wk / [place], wine, occasional beer    • Drug use: Never     Types: Marijuana   • Sexual activity: Yes     Partners: Female     Birth control/protection: Surgical       Review of Systems:  The following systems were reviewed as they relate to the cardiovascular system: Constitutional, Eyes, ENT, Cardiovascular,  Respiratory, Gastrointestinal, Integumentary, Neurological, Psychiatric, Hematologic, Endocrine, Musculoskeletal, and Genitourinary. The pertinent cardiovascular findings are reported above with all other cardiovascular points within those systems being negative.    Diagnostic Study Review:     Current Electrocardiogram:    ECG 12 Lead    Date/Time: 3/30/2023 12:59 PM  Performed by: Micheal Mejias DO  Authorized by: Micheal Mejias DO   Comparison: compared with previous ECG from 6/29/2022  Similar to previous ECG  Comments: Normal sinus rhythm with a ventricular rate of 76 bpm.  Left atrial enlargement.  Old inferior MI.  Normal QT and QTc intervals.  Nonspecific repolarization changes.  Normal QRS axis.            Laboratory Data:  Lab Results   Component Value Date    GLUCOSE 103 (H) 03/22/2023    BUN 16 03/22/2023    CREATININE 1.01 03/22/2023    EGFRIFNONA 93 08/10/2021    EGFRIFAFRI 107 08/10/2021    BCR 16 03/22/2023    K 4.3 03/22/2023    CO2 25 03/22/2023    CALCIUM 9.5 03/22/2023    PROTENTOTREF 7.4 06/28/2022    ALBUMIN 4.6 06/28/2022    LABIL2 1.6 06/28/2022    AST 47 (H) 06/28/2022     (H) 06/28/2022     Lab Results   Component Value Date    GLUCOSE 103 (H) 03/22/2023    CALCIUM 9.5 03/22/2023     03/22/2023    K 4.3 03/22/2023    CO2 25 03/22/2023     03/22/2023    BUN 16 03/22/2023    CREATININE 1.01 03/22/2023    EGFRIFAFRI 107 08/10/2021    EGFRIFNONA 93 08/10/2021    BCR 16 03/22/2023    ANIONGAP 9.0 01/14/2020     Lab Results   Component Value Date    WBC 7.7 06/28/2022    HGB 17.8 (H) 06/28/2022    HCT 54.2 (H) 06/28/2022    MCV 88 06/28/2022     06/28/2022     Lab Results   Component Value Date    CHOL 170 05/08/2019    CHLPL 254 (H) 03/22/2023    TRIG 198 (H) 03/22/2023    HDL 39 (L) 03/22/2023     (H) 03/22/2023     No results found for: HGBA1C  No results found for: INR, PROTIME    Most Recent Echo:       Most Recent Stress  Test:  Results for orders placed during the hospital encounter of 01/13/20    Stress Test With Myocardial Perfusion One Day    Interpretation Summary  · Findings consistent with an abnormal ECG stress test secondary to nonsustained ventricular tachycardia occurring at peak exercise  · No scintigraphic evidence for provokable ischemia  · Fixed inferobasilar defect likely secondary to technical factors  · Left ventricular ejection fraction is normal (Calculated EF = 58%).  · Impressions are consistent with a low risk study.  · There is no prior study available for comparison.  · Cardiac catheterization recommended secondary to the presence of nonsustained ventricular tachycardia    Stress portion of this exam was supervised by: Najma Rivera NP       Most Recent Cardiac Catheterization:   Results for orders placed during the hospital encounter of 01/13/20    Cardiac Catheterization/Vascular Study    Narrative  Cardiac Catheterization Operative Report    Kemal Dumont  [id number]  1/14/2020  @PCP@    He underwent left heart catheterization with selective coronary angiography, left ventriculography, right common iliac angiography.    Indications for the procedure include: abnormal stress test.    Procedure Details:  The risks, benefits, complications, treatment options, and expected outcomes were discussed with the patient. The patient and/or family concurred with the proposed plan, giving informed consent.    After informed consent the patient was brought to the cath lab after appropriate IV hydration was begun and oral premedication was given. He was further sedated with fentanyl and midazolam. He was prepped and draped in the usual manner. Using the modified Seldinger access technique, a 6 Sudanese sheath was placed in the femoral artery. A left heart catheterization with coronary arteriography was performed. Findings are discussed below.    After the procedure was completed, sedation was stopped and the  sheaths and catheters were all removed. Hemostasis was achieved per established hospital protocols.    Findings:    Hemodynamics  LVEDP: 6  LV: 100/0  AO: 100/70  Left Main  very large, no significant disease  RCA  very large, ectatic, no occlusive disease  LAD  very large, ectatic no occlusive disease  Circ  very large, ectatic no occlusive disease  SVG(s)  not applicable  ANTONIO  not applicable  LV  normal LV systolic function EF 55%  Coronary Dominance  RCA    Estimated Blood Loss:  Minimal    Complications:  None; patient tolerated the procedure well.    Disposition: PACU - hemodynamically stable    Condition: stable    Impressions:  No angiographic evidence for significant epicardial occlusive disease  Very large and ectatic coronary arteries with sluggish flow  Normal left ventricular systolic function ejection fraction 55%    Recommendations:  Add Plavix to daily aspirin  Aggressively treat risk factors  Cardiac status otherwise appropriate for discharge after bedrest.  Follow-up 2 weeks       NOTE: The following portions of the patient's note were reviewed, confirmed and/or updated this visit as appropriate: History of present illness/Interval history, physical examination, assessment & plan, allergies, current medications, past family history, past medical history, past social history, past surgical history and problem list.     Airway patent, Nasal mucosa clear. Mouth with normal mucosa. Throat has no vesicles, no oropharyngeal exudates and uvula is midline.

## 2023-03-31 ENCOUNTER — TELEPHONE (OUTPATIENT)
Dept: FAMILY MEDICINE CLINIC | Facility: CLINIC | Age: 48
End: 2023-03-31
Payer: COMMERCIAL

## 2023-03-31 NOTE — TELEPHONE ENCOUNTER
Caller: Kemal Dumont    Relationship: Self    Best call back number: [phone]    What is the best time to reach you: ANY    Who are you requesting to speak with (clinical staff, provider,  specific staff member): MALGORZATA AGUDELO    Do you know the name of the person who called: KEMAL    What was the call regarding: PATIENT'S CARDIOLOGIST, MICHEAL QUINONES RECOMMENDED THAT PATIENT GET AN APPOINTMENT WITH MALGORZATA AGUDELO.    Do you require a callback: PLEASE CALL AND ADVISE.

## 2023-04-03 NOTE — TELEPHONE ENCOUNTER
HUB TO SHARE: Unfortunately Alfie Bruce is not open to taking new patients at this time. Dr Humberto Terry is but is currently booked until June for new patients. You are welcome to call us at [phone] to schedule an appointment with him.

## 2023-04-04 ENCOUNTER — OFFICE VISIT (OUTPATIENT)
Dept: FAMILY MEDICINE CLINIC | Facility: CLINIC | Age: 48
End: 2023-04-04
Payer: COMMERCIAL

## 2023-04-04 DIAGNOSIS — I10 PRIMARY HYPERTENSION: ICD-10-CM

## 2023-04-04 DIAGNOSIS — R74.8 ELEVATED LIVER ENZYMES: ICD-10-CM

## 2023-04-04 DIAGNOSIS — R73.9 ELEVATED BLOOD SUGAR: ICD-10-CM

## 2023-04-04 DIAGNOSIS — F33.1 MODERATE EPISODE OF RECURRENT MAJOR DEPRESSIVE DISORDER: Primary | ICD-10-CM

## 2023-04-04 DIAGNOSIS — E78.2 MIXED HYPERLIPIDEMIA: ICD-10-CM

## 2023-04-04 DIAGNOSIS — E66.01 MORBIDLY OBESE: ICD-10-CM

## 2023-04-04 PROCEDURE — 99214 OFFICE O/P EST MOD 30 MIN: CPT | Performed by: NURSE PRACTITIONER

## 2023-04-04 RX ORDER — DULOXETIN HYDROCHLORIDE 30 MG/1
30 CAPSULE, DELAYED RELEASE ORAL DAILY
Qty: 30 CAPSULE | Refills: 12 | Status: SHIPPED | OUTPATIENT
Start: 2023-04-04

## 2023-04-04 NOTE — PROGRESS NOTES
Chief Complaint  Depression    Subjective          Kemal Dumont presents to [facility] for depression, elevated fasting blood sugars, elevated lfts.    History of Present Illness    Pt is here to fu after recently seeing the cardiologist and having blood work.  He was seen here one time after previous PCP, retired.  He had a high b/p and fu on elevated lfts. Previously was seen by GI one time at practice in [place]. No fu since.     Pt wanted to try to treat his b/p the 'natural way'.  He started a keto and low carb diet and exercise, lifting wts.  He also was using continue glucose monitoring and intermit fasting.  He injured his shoulder in Nov.  He is fu with [facility] with PT as well.  He has gained his wt back. Reporting his fasting bs was running around 115.    He previously was on Wellbutrin for depression.  He had gone off that as well. He has a lot of stress in his life.  He would like to start back on something.      He is off his statin.  Needs evaluation by GI and labs to determine if can manage chol with a statin or injectable medication.  He was started back on his amlodipine and samples of edarbi, as well as plavix.  He has a fu with cardiology in about one month.    Pt is frustrated with wt due to his inability to loss wt.            Kemal Dumont  has a past medical history of Allergic rhinitis, Anserine bursitis (l), Depression, History of tobacco use, Hyperlipidemia, Hypertension, Left shoulder strain, and VT (ventricular tachycardia) (01/13/2020).      Review of Systems   Constitutional: Positive for unexpected weight change. Negative for fatigue and fever.   Cardiovascular: Negative for chest pain and palpitations.   Musculoskeletal: Positive for arthralgias.        Shoulder pain   Psychiatric/Behavioral: The patient is nervous/anxious.         Objective       Current Outpatient Medications:   •  amLODIPine (NORVASC) 10 MG tablet, Take 1  "tablet by mouth Daily., Disp: 90 tablet, Rfl: 3  •  aspirin (aspirin) 81 MG EC tablet, Take 1 tablet by mouth Daily., Disp: 90 tablet, Rfl: 3  •  azilsartan medoxomil (Edarbi) 80 MG tablet tablet, Take 1 tablet by mouth Daily., Disp: , Rfl:   •  clopidogrel (Plavix) 75 MG tablet, Take 1 tablet by mouth Daily., Disp: 90 tablet, Rfl: 3  •  MAGNESIUM PO, Take  by mouth. At bedtime for sleep, Disp: , Rfl:   •  multivitamin with minerals tablet tablet, Take 1 tablet by mouth Daily., Disp: , Rfl:   •  Omega-3 Fatty Acids (fish oil) 1000 MG capsule capsule, Take  by mouth Daily With Breakfast., Disp: , Rfl:   •  DULoxetine (CYMBALTA) 30 MG capsule, Take 1 capsule by mouth Daily., Disp: 30 capsule, Rfl: 12  •  valsartan-hydrochlorothiazide (DIOVAN-HCT) 320-25 MG per tablet, TAKE ONE TABLET BY MOUTH ONCE DAILY (Patient not taking: Reported on 4/4/2023), Disp: 90 tablet, Rfl: 1    Vital Signs:      /100   Pulse 79   Temp 98.3 °F (36.8 °C) (Infrared)   Resp 16   Ht 180.3 cm (71\")   Wt (!) 137 kg (302 lb)   SpO2 97%   BMI 42.12 kg/m²     Vitals:    04/04/23 1031 04/04/23 1035 04/04/23 1040   BP: 154/97 (!) 162/107 156/100   BP Location: Right arm Left arm    Patient Position: Sitting Sitting    Cuff Size: Large Adult Large Adult    Pulse: 79     Resp: 16     Temp: 98.3 °F (36.8 °C)     TempSrc: Infrared     SpO2: 97%     Weight: (!) 137 kg (302 lb)     Height: 180.3 cm (71\")        Physical Exam  Vitals and nursing note reviewed.   Constitutional:       Appearance: He is well-developed. He is obese.   Cardiovascular:      Rate and Rhythm: Normal rate and regular rhythm.      Heart sounds: Normal heart sounds. No murmur heard.    No friction rub. No gallop.   Pulmonary:      Effort: Pulmonary effort is normal.      Breath sounds: Normal breath sounds. No wheezing or rales.   Skin:     General: Skin is warm and dry.   Neurological:      Mental Status: He is alert.   Psychiatric:         Mood and Affect: Mood is " anxious. Affect is tearful.         Behavior: Behavior normal.         Thought Content: Thought content normal.        Result Review :                  PHQ-9 Total Score: 9           Assessment and Plan    Diagnoses and all orders for this visit:    1. Moderate episode of recurrent major depressive disorder (Primary)  Assessment & Plan:  Patient's depression is recurrent and is moderate without psychosis. Their depression is currently active and the condition is worsening. This will be reassessed in 4 weeks. F/U as described:patient was prescribed an antidepressant medicine and patient depression is being managed by their pcp.      2. Body mass index (BMI) of 40.0 to 44.9 in adult    3. Primary hypertension  Assessment & Plan:  Has been off some medication  Started new medication last week with cardiology      4. Morbidly obese  Comments:  Discussed walking 30 to 45 minutes at least 5 days a week.  Assessment & Plan:  Patient's (Body mass index is 42.12 kg/m².) indicates that they are morbidly/severely obese (BMI > 40 or > 35 with obesity - related health condition) with health conditions that include hypertension, coronary heart disease and dyslipidemias . Weight is unchanged. BMI  is above average; BMI management plan is completed. We discussed portion control and increasing exercise.       5. Elevated blood sugar  Comments:  A1c today  Orders:  -     Comprehensive Metabolic Panel  -     Hemoglobin A1c    6. Elevated liver enzymes  Comments:  Repeating LFT and hepatitis panel.  May need ultrasound and GI referral  Orders:  -     Comprehensive Metabolic Panel  -     Hemoglobin A1c  -     Cancel: Ambulatory Referral to Gastroenterology  -     Hepatitis panel, acute  -     US Abdomen Limited; Future  -     Ambulatory Referral to Gastroenterology    7. Mixed hyperlipidemia  Assessment & Plan:  Reviewed lipids      Other orders  -     DULoxetine (CYMBALTA) 30 MG capsule; Take 1 capsule by mouth Daily.  Dispense: 30  capsule; Refill: 12  -     Interpretation:       Problem List Items Addressed This Visit        Active Problems    Depression - Primary    Current Assessment & Plan     Patient's depression is recurrent and is moderate without psychosis. Their depression is currently active and the condition is worsening. This will be reassessed in 4 weeks. F/U as described:patient was prescribed an antidepressant medicine and patient depression is being managed by their pcp.         Relevant Medications    DULoxetine (CYMBALTA) 30 MG capsule    Hyperlipidemia    Current Assessment & Plan     Reviewed lipids         Hypertension    Current Assessment & Plan     Has been off some medication  Started new medication last week with cardiology         Relevant Medications    azilsartan medoxomil (Edarbi) 80 MG tablet tablet    Morbidly obese    Current Assessment & Plan     Patient's (Body mass index is 42.12 kg/m².) indicates that they are morbidly/severely obese (BMI > 40 or > 35 with obesity - related health condition) with health conditions that include hypertension, coronary heart disease and dyslipidemias . Weight is unchanged. BMI  is above average; BMI management plan is completed. We discussed portion control and increasing exercise.          Body mass index (BMI) of 40.0 to 44.9 in adult   Other Visit Diagnoses     Elevated blood sugar        A1c today    Relevant Orders    Comprehensive Metabolic Panel (Completed)    Hemoglobin A1c (Completed)    Elevated liver enzymes        Repeating LFT and hepatitis panel.  May need ultrasound and GI referral    Relevant Orders    Comprehensive Metabolic Panel (Completed)    Hemoglobin A1c (Completed)    Hepatitis panel, acute (Completed)    US Abdomen Limited    Ambulatory Referral to Gastroenterology (Completed)          Follow Up   Return in about 4 weeks (around 5/2/2023) for Recheck depression.  Patient was given instructions and counseling regarding his condition or for health  maintenance advice. Please see specific information pulled into the AVS if appropriate.

## 2023-04-05 ENCOUNTER — TELEPHONE (OUTPATIENT)
Dept: FAMILY MEDICINE CLINIC | Facility: CLINIC | Age: 48
End: 2023-04-05
Payer: COMMERCIAL

## 2023-04-05 VITALS
TEMPERATURE: 98.3 F | WEIGHT: 302 LBS | BODY MASS INDEX: 42.28 KG/M2 | HEIGHT: 71 IN | SYSTOLIC BLOOD PRESSURE: 156 MMHG | HEART RATE: 79 BPM | OXYGEN SATURATION: 97 % | DIASTOLIC BLOOD PRESSURE: 100 MMHG | RESPIRATION RATE: 16 BRPM

## 2023-04-05 DIAGNOSIS — E66.01 MORBIDLY OBESE: Primary | ICD-10-CM

## 2023-04-05 LAB
ALBUMIN SERPL-MCNC: 4.5 G/DL (ref 4–5)
ALBUMIN/GLOB SERPL: 1.7 {RATIO} (ref 1.2–2.2)
ALP SERPL-CCNC: 50 IU/L (ref 44–121)
ALT SERPL-CCNC: 313 IU/L (ref 0–44)
AST SERPL-CCNC: 136 IU/L (ref 0–40)
BILIRUB SERPL-MCNC: 0.5 MG/DL (ref 0–1.2)
BUN SERPL-MCNC: 20 MG/DL (ref 6–24)
BUN/CREAT SERPL: 19 (ref 9–20)
CALCIUM SERPL-MCNC: 10.2 MG/DL (ref 8.7–10.2)
CHLORIDE SERPL-SCNC: 100 MMOL/L (ref 96–106)
CO2 SERPL-SCNC: 20 MMOL/L (ref 20–29)
CREAT SERPL-MCNC: 1.03 MG/DL (ref 0.76–1.27)
EGFRCR SERPLBLD CKD-EPI 2021: 90 ML/MIN/1.73
GLOBULIN SER CALC-MCNC: 2.6 G/DL (ref 1.5–4.5)
GLUCOSE SERPL-MCNC: 97 MG/DL (ref 70–99)
HAV IGM SERPL QL IA: NEGATIVE
HBA1C MFR BLD: 5.5 % (ref 4.8–5.6)
HBV CORE IGM SERPL QL IA: NEGATIVE
HBV SURFACE AG SERPL QL IA: NEGATIVE
HCV AB SERPL QL IA: NORMAL
HCV IGG SERPL QL IA: NON REACTIVE
POTASSIUM SERPL-SCNC: 4.7 MMOL/L (ref 3.5–5.2)
PROT SERPL-MCNC: 7.1 G/DL (ref 6–8.5)
SODIUM SERPL-SCNC: 139 MMOL/L (ref 134–144)

## 2023-04-05 NOTE — ASSESSMENT & PLAN NOTE
Patient's (Body mass index is 42.12 kg/m².) indicates that they are morbidly/severely obese (BMI > 40 or > 35 with obesity - related health condition) with health conditions that include hypertension, coronary heart disease and dyslipidemias . Weight is unchanged. BMI  is above average; BMI management plan is completed. We discussed portion control and increasing exercise.

## 2023-04-06 LAB
Lab: NORMAL
T3 SERPL-MCNC: 124 NG/DL (ref 71–180)
T4 FREE SERPL-MCNC: 1.49 NG/DL (ref 0.82–1.77)
TSH SERPL DL<=0.005 MIU/L-ACNC: 2.25 UIU/ML (ref 0.45–4.5)
WRITTEN AUTHORIZATION: NORMAL

## 2023-04-10 ENCOUNTER — PATIENT MESSAGE (OUTPATIENT)
Dept: CARDIOLOGY | Facility: CLINIC | Age: 48
End: 2023-04-10
Payer: COMMERCIAL

## 2023-04-11 ENCOUNTER — TREATMENT (OUTPATIENT)
Dept: PHYSICAL THERAPY | Facility: CLINIC | Age: 48
End: 2023-04-11
Payer: COMMERCIAL

## 2023-04-11 DIAGNOSIS — M25.512 LEFT SHOULDER PAIN, UNSPECIFIED CHRONICITY: Primary | ICD-10-CM

## 2023-04-11 PROCEDURE — 97140 MANUAL THERAPY 1/> REGIONS: CPT | Performed by: PHYSICAL THERAPIST

## 2023-04-11 PROCEDURE — 97162 PT EVAL MOD COMPLEX 30 MIN: CPT | Performed by: PHYSICAL THERAPIST

## 2023-04-11 PROCEDURE — 97110 THERAPEUTIC EXERCISES: CPT | Performed by: PHYSICAL THERAPIST

## 2023-04-11 NOTE — PROGRESS NOTES
Physical Therapy Initial Evaluation and Plan of Care    Patient: Kemal Dumont   : 1975  Diagnosis/ICD-10 Code:  Left shoulder pain, unspecified chronicity [M25.512]  Referring practitioner: Fernando Abdullahi MD  Date of Initial Visit: 2023  Today's Date: 2023  Patient seen for 1 sessions           Subjective Questionnaire: QuickDASH: 20%      Subjective Evaluation    History of Present Illness  Mechanism of injury: Patient presents to physical therapy with cc of left shoulder pain since last fall.  Reports that he was working out at the gym and had pain in left shoulder that has progressively gotten worse.  Reports that he has stopped going to gym due to pain in left shoulder.  Reports pain with sleeping as he lays on his left side.  Patient works a desk job and is not limited with work activities, however is limited with ADL's and recreational activities with lifting and overhead activities.     Patient had cortisone injection a few weeks ago and pain has reduced slightly.     Pain  Current pain ratin  Location: anterior/posterior left shoulder   Quality: sharp  Relieving factors: ice  Aggravating factors: overhead activity, lifting, sleeping and outstretched reach  Progression: worsening    Hand dominance: right    Diagnostic Tests  X-ray: abnormal    Treatments  Previous treatment: injection treatment  Patient Goals  Patient goals for therapy: decreased pain, increased motion, increased strength and return to sport/leisure activities             Objective          Tenderness     Left Shoulder   Tenderness in the biceps tendon (proximal), infraspinatus tendon and supraspinatus tendon.     Active Range of Motion   Left Shoulder   Flexion: 145 degrees   Abduction: 142 degrees   External rotation BTH: T1   Internal rotation BTB: L5     Right Shoulder   Flexion: 165 degrees   Abduction: 160 degrees   External rotation BTH: T3   Internal rotation BTB: L1     Strength/Myotome Testing     Left  Shoulder     Planes of Motion   Flexion: 4+   Abduction: 4+   External rotation at 0°: 4+   Internal rotation at 0°: 5     Isolated Muscles   Biceps: 5   Triceps: 5     Right Shoulder     Planes of Motion   Flexion: 5   Abduction: 5   External rotation at 0°: 5   Internal rotation at 0°: 5     Isolated Muscles   Biceps: 5   Triceps: 5     Tests     Left Shoulder   Positive empty can, Hawkin's and painful arc.           Assessment & Plan     Assessment  Impairments: abnormal or restricted ROM, impaired physical strength, lacks appropriate home exercise program and pain with function  Functional Limitations: lifting, uncomfortable because of pain, reaching behind back and reaching overhead  Assessment details: Patient presents to physical therapy with s/s of left shoulder pain.  Patient demonstrates limited AROM in left shoulder, weakness in left shoulder and pain with overhead reaching.  Patient is appropriate for PT intervention in order to restore strength and decrease pain in left shoulder.   Prognosis: good    Goals  Plan Goals: In two weeks, patient will report at least 25% reduction in pain level.    In two weeks, patient will demonstrate at least 50% improvement in AROM in left shoulder in order to demonstrate ability to complete hygenic activities with less limitation.     In four weeks, patient will demonstrate 5/5 muscular strength in L shoulder without pain with resisted testing in order to demonstrate ability to lift items with yard work without limitation.   In four weeks, patient will demonstrate full AROM in left shoulder without pain.   In four weeks, patient will demonstrate proper technique with HEP.   In four weeks, patient will demonstrate decreased perceived disability by decreasing score on QuickDASH by at least 12%.       Plan  Therapy options: will be seen for skilled therapy services  Planned modality interventions: cryotherapy, dry needling, thermotherapy (hydrocollator packs) and  TENS  Planned therapy interventions: manual therapy, neuromuscular re-education, soft tissue mobilization, strengthening, stretching, therapeutic activities, joint mobilization, home exercise program and functional ROM exercises  Duration in weeks: 6  Plan details: 1-2 times per week        Manual Therapy:    10     mins  92446;  Therapeutic Exercise:    15     mins  95778;     Neuromuscular Scotty:        mins  28271;    Therapeutic Activity:          mins  32239;     Gait Training:           mins  96346;     Ultrasound:          mins  07735;    Electrical Stimulation:         mins  91887 ( );  Dry Needling          mins self-pay    Timed Treatment:   25   mins   Total Treatment:     55   mins    PT SIGNATURE: Santino Bullock PT   DATE TREATMENT INITIATED: 4/11/2023    Initial Certification  Certification Period: 7/10/2023  I certify that the therapy services are furnished while this patient is under my care.  The services outlined above are required by this patient, and will be reviewed every 90 days.     PHYSICIAN: Fernando Abdullahi MD      DATE:     Please sign and return via fax to [phone].. Thank you, [facility].

## 2023-04-12 RX ORDER — AZILSARTAN KAMEDOXOMIL AND CHLORTHALIDONE 40; 25 MG/1; MG/1
1 TABLET ORAL DAILY
Qty: 21 TABLET | Refills: 0 | COMMUNITY
Start: 2023-04-12

## 2023-04-13 ENCOUNTER — HOSPITAL ENCOUNTER (OUTPATIENT)
Dept: ULTRASOUND IMAGING | Facility: HOSPITAL | Age: 48
Discharge: HOME | End: 2023-04-13
Admitting: NURSE PRACTITIONER
Payer: COMMERCIAL

## 2023-04-13 DIAGNOSIS — R74.8 ELEVATED LIVER ENZYMES: ICD-10-CM

## 2023-04-13 PROCEDURE — 76705 ECHO EXAM OF ABDOMEN: CPT

## 2023-04-17 ENCOUNTER — TREATMENT (OUTPATIENT)
Dept: PHYSICAL THERAPY | Facility: CLINIC | Age: 48
End: 2023-04-17
Payer: COMMERCIAL

## 2023-04-17 DIAGNOSIS — M25.512 LEFT SHOULDER PAIN, UNSPECIFIED CHRONICITY: Primary | ICD-10-CM

## 2023-04-17 NOTE — PROGRESS NOTES
Physical Therapy Daily Progress Note    Patient: Kemal Dumont   : 1975  Diagnosis/ICD-10 Code:  Left shoulder pain, unspecified chronicity [M25.512]  Referring practitioner: Fernando Abdullahi MD  Date of Initial Visit: Type: THERAPY  Noted: 2023  Today's Date: 2023  Patient seen for 2 sessions         Kemal Dumont reports:  Was feeling well after eval last week and was compliant with HEP until Saturday when he reached behind his back in the shower and felt sharp pain in left shoulder.  Reports increased pain with movement of left shoulder since this episode and has held home exercises.     Objective   See Exercise, Manual, and Modality Logs for complete treatment.       Assessment/Plan     Noted continued 4+/5 muscular strength with external rotator muscles at 0 degrees of abduction at beginning of visit.  Patient reports pain with movement > 90 degrees of shoulder flexion/abduction today.  Held select exercises, specifically overhead this visit.  Feeling well after modalities at end of visit.     Progress per Plan of Care           Manual Therapy:    10     mins  07207;  Therapeutic Exercise:    15     mins  15068;     Neuromuscular Scotty:        mins  54470;    Therapeutic Activity:          mins  09617;     Gait Training:           mins  54470;     Ultrasound:          mins  66959;    Electrical Stimulation:    10     mins  74552 ( );  Dry Needling          mins self-pay    Timed Treatment:   25   mins   Total Treatment:     35   mins    Santino Bullock PT  Physical Therapist

## 2023-04-20 ENCOUNTER — OFFICE (OUTPATIENT)
Dept: URBAN - METROPOLITAN AREA CLINIC 64 | Facility: CLINIC | Age: 48
End: 2023-04-20

## 2023-04-20 VITALS
HEIGHT: 71 IN | DIASTOLIC BLOOD PRESSURE: 89 MMHG | SYSTOLIC BLOOD PRESSURE: 138 MMHG | WEIGHT: 294 LBS | HEART RATE: 94 BPM

## 2023-04-20 DIAGNOSIS — R74.8 ABNORMAL LEVELS OF OTHER SERUM ENZYMES: ICD-10-CM

## 2023-04-20 DIAGNOSIS — K76.0 FATTY (CHANGE OF) LIVER, NOT ELSEWHERE CLASSIFIED: ICD-10-CM

## 2023-04-20 DIAGNOSIS — R63.4 ABNORMAL WEIGHT LOSS: ICD-10-CM

## 2023-04-20 DIAGNOSIS — F10.10 ALCOHOL ABUSE, UNCOMPLICATED: ICD-10-CM

## 2023-04-20 DIAGNOSIS — Z79.82 LONG TERM (CURRENT) USE OF ASPIRIN: ICD-10-CM

## 2023-04-20 PROCEDURE — 99203 OFFICE O/P NEW LOW 30 MIN: CPT

## 2023-04-20 NOTE — SERVICEHPINOTES
47 year old male here for evaluation regarding elevated LFTs. 
br
brPatient denies a history of liver disease, jaundice, excessive alcohol intake, iv/intranasal drugs, new medications, chemical exposure, exotic travel, blood transfusions, tattoos. He has a FH of etoh cirrhosis in his maternal grandfather. He drinks alcohol a few times weekly, usually a couple glasses of wine. Can drink more on a weekend evening but not consistently. 
br
tyroneHe reports progressive increase in his LFTs over the last 1.5 years. In 2021, AST was 47 and ALT was 101. He was doing a keto diet and his statin was stopped at this time due to the elevation in LFTs. He also had 1 year of taking a variety of supplements, but has since stopped this. He is eating healthy, walking 2-3 miles daily and was previously lifting weights 3 times weekly but injured his shoulder. He is going to PT for this. He has lost 30lbs in the last year intentionally. His cholesterol remains elevated. Discussed that he is discussing with his cardiologist about starting an injection - ?repatha, rather than restarting statin. He has been off of his statin since Fall 2021. He takes fish oil. He has no GI complaints today. 
br
tyorne Note - he donates blood frequently. brspan id="{[id number]}" class="narrative freetextSelected" type="freetext" canedit="true" suppressed="false" nid="[id number]" gid="{[id number]}" bound="false" visited="true"br
4/23 - creatinine 1.03, , , ALT is 313, alk phos 50, total bili 0.5, hepatitis panel negative 4/23 - hepatic steatosis, CBD 3mm
br
br  Had a colonoscopy in fall 2021 which was normal. No polyps. Had a colonoscopy in early 20s d/t bleeding and had polyps. This was in [place]. ([facility]) . br /spanspan id="{[id number]}" class="narrative placeholderNormal" type="placeholder" canedit="true" suppressed="false" nid="[id number]" gid="{[id number]}" propertyname="rosWording" displayname="ROS Wording" tooltip="" handler="" handlerdata="" datatype="text" mandatory="false" requires="" allowmultipleentries="" describes="" tagname="" prototype="" dontshowempty="false" empty="true" onmouseover="__NarrativeOnMouseOver('{[id number]}')" onmouseout="__NarrativeOnMouseOut('{[id number]}')" onmousedown="__NarrativePlaceholderClicked('{[id number]}')"[ROS Wording]/spanspan id="{[id number]}" class="narrative freetextNormal" type="freetext" canedit="true" suppressed="false" nid="[id number]" gid="{[id number]}" bound="false" /span

## 2023-04-21 ENCOUNTER — TREATMENT (OUTPATIENT)
Dept: PHYSICAL THERAPY | Facility: CLINIC | Age: 48
End: 2023-04-21
Payer: COMMERCIAL

## 2023-04-21 DIAGNOSIS — M25.512 LEFT SHOULDER PAIN, UNSPECIFIED CHRONICITY: Primary | ICD-10-CM

## 2023-04-21 NOTE — PROGRESS NOTES
Physical Therapy Daily Progress Note    Patient: Kemal Dumont   : 1975  Diagnosis/ICD-10 Code:  Left shoulder pain, unspecified chronicity [M25.512]  Referring practitioner: Fernando Abdullahi MD  Date of Initial Visit: Type: THERAPY  Noted: 2023  Today's Date: 2023  Patient seen for 3 sessions         Kemal Dumont reports:  Left shoulder feeling better than earlier in the week.  Still having sharp pains in shoulder occasional with outstretched reach/overhead movements.    Objective   See Exercise, Manual, and Modality Logs for complete treatment.       Assessment/Plan     Tolerates manual therapy and progression of exercise well this visit.  Patient feeling well at end of visit after modalities.    Progress per Plan of Care           Manual Therapy:    10     mins  32581;  Therapeutic Exercise:    10     mins  00849;     Neuromuscular Scotty:    10    mins  67602;    Therapeutic Activity:          mins  90308;     Gait Training:           mins  27759;     Ultrasound:          mins  48835;    Electrical Stimulation:    10     mins  73931 ( );  Dry Needling          mins self-pay    Timed Treatment:   40   mins   Total Treatment:     40   mins    Santino Bullock PT  Physical Therapist

## 2023-04-24 ENCOUNTER — TELEPHONE (OUTPATIENT)
Dept: PHYSICAL THERAPY | Facility: CLINIC | Age: 48
End: 2023-04-24

## 2023-04-26 PROBLEM — M25.512 PAIN IN JOINT OF LEFT SHOULDER: Status: ACTIVE | Noted: 2023-03-31

## 2023-04-26 PROBLEM — M75.42 IMPINGEMENT SYNDROME OF LEFT SHOULDER REGION: Status: ACTIVE | Noted: 2023-03-31

## 2023-04-26 RX ORDER — AZILSARTAN KAMEDOXOMIL AND CHLORTHALIDONE 40; 25 MG/1; MG/1
1 TABLET ORAL DAILY
Qty: 30 TABLET | Refills: 6 | COMMUNITY
Start: 2023-04-26

## 2023-04-26 NOTE — TELEPHONE ENCOUNTER
Per Dr Mejias    Lets go ahead and start him on Repatha 140 mg every 2 weeks for his hyperlipidemia.  This will be the safer option for LFTs.     Lets go ahead and do a prescription for Edarbi chlor since it looks like it is doing a really good job     What ever prescriptions we can move to the pharmacy of his choice lets go ahead and do to minimize any lapse in care.       Patient informed- RX has been sent. He is aware both will need a PA and could take 72 hours for a decision.

## 2023-05-03 ENCOUNTER — OFFICE VISIT (OUTPATIENT)
Dept: FAMILY MEDICINE CLINIC | Facility: CLINIC | Age: 48
End: 2023-05-03
Payer: COMMERCIAL

## 2023-05-03 VITALS
HEART RATE: 94 BPM | DIASTOLIC BLOOD PRESSURE: 80 MMHG | BODY MASS INDEX: 41.16 KG/M2 | SYSTOLIC BLOOD PRESSURE: 136 MMHG | HEIGHT: 71 IN | WEIGHT: 294 LBS | RESPIRATION RATE: 16 BRPM | TEMPERATURE: 97.6 F | OXYGEN SATURATION: 96 %

## 2023-05-03 DIAGNOSIS — E66.01 MORBIDLY OBESE: ICD-10-CM

## 2023-05-03 DIAGNOSIS — F33.1 MODERATE EPISODE OF RECURRENT MAJOR DEPRESSIVE DISORDER: ICD-10-CM

## 2023-05-03 DIAGNOSIS — I10 PRIMARY HYPERTENSION: ICD-10-CM

## 2023-05-03 DIAGNOSIS — R74.8 ELEVATED LIVER ENZYMES: ICD-10-CM

## 2023-05-03 DIAGNOSIS — E78.2 MIXED HYPERLIPIDEMIA: ICD-10-CM

## 2023-05-03 PROBLEM — K76.0 HEPATIC STEATOSIS: Status: ACTIVE | Noted: 2023-05-03

## 2023-05-03 PROBLEM — K63.5 COLON POLYPS: Status: ACTIVE | Noted: 2023-05-03

## 2023-05-03 RX ORDER — DULOXETINE 40 MG/1
40 CAPSULE, DELAYED RELEASE ORAL DAILY
Qty: 90 CAPSULE | Refills: 3 | Status: SHIPPED | OUTPATIENT
Start: 2023-05-03 | End: 2023-05-05

## 2023-05-03 RX ORDER — UBIDECARENONE 100 MG
100 CAPSULE ORAL DAILY
COMMUNITY

## 2023-05-03 NOTE — ASSESSMENT & PLAN NOTE
Patient's depression is recurrent and is moderate without psychosis. Their depression is currently in partial remission and the condition is improving with treatment. This will be reassessed call back in 2 months after increase in dosage 40 mg. F/U as described:patient was prescribed an antidepressant medicine and patient depression is being managed by their pcp.

## 2023-05-03 NOTE — ASSESSMENT & PLAN NOTE
Patient's (Body mass index is 41 kg/m².) indicates that they are morbidly/severely obese (BMI > 40 or > 35 with obesity - related health condition) with health conditions that include hypertension and dyslipidemias . Weight is improving with lifestyle modifications. BMI  is above average; BMI management plan is completed. We discussed portion control and increasing exercise.

## 2023-05-03 NOTE — PROGRESS NOTES
Chief Complaint  Depression    Subjective          Kemal Dumont presents to [facility] for depression and anxiety, elevation in LFTs    History of Present Illness    She was here approximately a month ago and started on Cymbalta for anxiety and depression.  He has had improvement with the 30 mg.  This medication is better than Wellbutrin however he does not feel like it is completely taking care of all of his anxiety.  Discussed dosage adjustment.  Will call in 2 months after increasing it to 40 mg    Patient has been eating healthier and lost several pounds.    He had some elevated LFTs and saw with [facility].  Is thought that may be related to his cholesterol.  He is in the process of getting Repatha PA by cardiology.  He recently had an increase in blood pressure medication after not being able to donate blood.  He is following back up with cardiology again soon.    Patient is having MRI of the shoulder today.  Following up with Ortho after that  Kemal Dumont  has a past medical history of Allergic rhinitis, Anserine bursitis (l), Depression, History of tobacco use, Hyperlipidemia, Hypertension, Left shoulder strain, and VT (ventricular tachycardia) (01/13/2020).      Review of Systems   Constitutional: Negative for fatigue, fever and unexpected weight change.   Cardiovascular: Negative for chest pain and palpitations.   Musculoskeletal: Positive for arthralgias.        Shoulder pain   Psychiatric/Behavioral: The patient is nervous/anxious.         Improved with medication        Objective       Current Outpatient Medications:   •  amLODIPine (NORVASC) 10 MG tablet, Take 1 tablet by mouth Daily., Disp: 90 tablet, Rfl: 3  •  aspirin (aspirin) 81 MG EC tablet, Take 1 tablet by mouth Daily., Disp: 90 tablet, Rfl: 3  •  Azilsartan-Chlorthalidone (Edarbyclor) 40-25 MG tablet, Take 1 tablet by mouth Daily., Disp: 30 tablet, Rfl: 6  •  clopidogrel (Plavix) 75 MG tablet, Take 1  "tablet by mouth Daily., Disp: 90 tablet, Rfl: 3  •  coenzyme Q10 100 MG capsule, Take 1 capsule by mouth Daily., Disp: , Rfl:   •  DULoxetine 40 MG capsule delayed-release particles, Take 1 capsule by mouth Daily., Disp: 90 capsule, Rfl: 3  •  MAGNESIUM PO, Take  by mouth. At bedtime for sleep, Disp: , Rfl:   •  multivitamin with minerals tablet tablet, Take 1 tablet by mouth Daily., Disp: , Rfl:   •  Omega-3 Fatty Acids (fish oil) 1000 MG capsule capsule, Take  by mouth Daily With Breakfast., Disp: , Rfl:   •  Evolocumab (REPATHA) solution prefilled syringe injection, Inject 1 mL under the skin into the appropriate area as directed Every 14 (Fourteen) Days. (Patient not taking: Reported on 5/3/2023), Disp: 1 mL, Rfl: 6    Vital Signs:      /80   Pulse 94   Temp 97.6 °F (36.4 °C) (Infrared)   Resp 16   Ht 180.3 cm (71\")   Wt 133 kg (294 lb)   SpO2 96%   BMI 41.00 kg/m²     Vitals:    05/03/23 1029 05/03/23 1032 05/03/23 1049   BP: 151/90 163/79 136/80   BP Location: Right arm Left arm    Patient Position: Sitting Sitting    Cuff Size: Large Adult Large Adult    Pulse: 94     Resp: 16     Temp: 97.6 °F (36.4 °C)     TempSrc: Infrared     SpO2: 96%     Weight: 133 kg (294 lb)     Height: 180.3 cm (71\")        Physical Exam  Vitals and nursing note reviewed.   Constitutional:       Appearance: He is well-developed.   Cardiovascular:      Rate and Rhythm: Normal rate and regular rhythm.      Heart sounds: Normal heart sounds. No murmur heard.    No friction rub. No gallop.   Pulmonary:      Effort: Pulmonary effort is normal.      Breath sounds: Normal breath sounds. No wheezing or rales.   Skin:     General: Skin is warm and dry.   Neurological:      Mental Status: He is alert.   Psychiatric:         Mood and Affect: Mood normal.         Behavior: Behavior normal.         Thought Content: Thought content normal.         Judgment: Judgment normal.        Result Review :                  PHQ-9 Total Score: 5 "           Assessment and Plan    Diagnoses and all orders for this visit:    1. Body mass index (BMI) of 40.0 to 44.9 in adult (Primary)    2. Primary hypertension  Assessment & Plan:  Hypertension is improving with treatment.  Continue current treatment regimen.  Blood pressure will be reassessed at the next regular appointment.      3. Morbidly obese  Assessment & Plan:  Patient's (Body mass index is 41 kg/m².) indicates that they are morbidly/severely obese (BMI > 40 or > 35 with obesity - related health condition) with health conditions that include hypertension and dyslipidemias . Weight is improving with lifestyle modifications. BMI  is above average; BMI management plan is completed. We discussed portion control and increasing exercise.       4. Moderate episode of recurrent major depressive disorder  Assessment & Plan:  Patient's depression is recurrent and is moderate without psychosis. Their depression is currently in partial remission and the condition is improving with treatment. This will be reassessed call back in 2 months after increase in dosage 40 mg. F/U as described:patient was prescribed an antidepressant medicine and patient depression is being managed by their pcp.      5. Mixed hyperlipidemia  Comments:  Awaiting approval for Repatha    6. Elevated liver enzymes  Comments:  Following with [facility].  We will be starting cholesterol medicine to see if that improves LFTs.    Other orders  -     DULoxetine 40 MG capsule delayed-release particles; Take 1 capsule by mouth Daily.  Dispense: 90 capsule; Refill: 3       Problem List Items Addressed This Visit        Active Problems    Depression    Current Assessment & Plan     Patient's depression is recurrent and is moderate without psychosis. Their depression is currently in partial remission and the condition is improving with treatment. This will be reassessed call back in 2 months after increase in dosage 40 mg. F/U as described:patient was prescribed  an antidepressant medicine and patient depression is being managed by their pcp.         Relevant Medications    DULoxetine 40 MG capsule delayed-release particles    Hyperlipidemia    Hypertension    Current Assessment & Plan     Hypertension is improving with treatment.  Continue current treatment regimen.  Blood pressure will be reassessed at the next regular appointment.         Morbidly obese    Current Assessment & Plan     Patient's (Body mass index is 41 kg/m².) indicates that they are morbidly/severely obese (BMI > 40 or > 35 with obesity - related health condition) with health conditions that include hypertension and dyslipidemias . Weight is improving with lifestyle modifications. BMI  is above average; BMI management plan is completed. We discussed portion control and increasing exercise.          Body mass index (BMI) of 40.0 to 44.9 in adult - Primary   Other Visit Diagnoses     Elevated liver enzymes        Following with [facility].  We will be starting cholesterol medicine to see if that improves LFTs.          Follow Up   Return in about 6 months (around 11/3/2023) for Recheck depression and htn.  Patient was given instructions and counseling regarding his condition or for health maintenance advice. Please see specific information pulled into the AVS if appropriate.

## 2023-05-04 RX ORDER — AZILSARTAN KAMEDOXOMIL AND CHLORTHALIDONE 40; 25 MG/1; MG/1
1 TABLET ORAL DAILY
Qty: 30 TABLET | Refills: 6 | COMMUNITY
Start: 2023-05-04 | End: 2023-05-04 | Stop reason: SDUPTHER

## 2023-05-04 RX ORDER — AZILSARTAN KAMEDOXOMIL AND CHLORTHALIDONE 40; 25 MG/1; MG/1
1 TABLET ORAL DAILY
Qty: 30 TABLET | Refills: 6 | Status: SHIPPED | OUTPATIENT
Start: 2023-05-04

## 2023-05-05 ENCOUNTER — TELEPHONE (OUTPATIENT)
Dept: FAMILY MEDICINE CLINIC | Facility: CLINIC | Age: 48
End: 2023-05-05
Payer: COMMERCIAL

## 2023-05-05 RX ORDER — DULOXETIN HYDROCHLORIDE 60 MG/1
60 CAPSULE, DELAYED RELEASE ORAL DAILY
Qty: 30 CAPSULE | Refills: 1 | Status: SHIPPED | OUTPATIENT
Start: 2023-05-05

## 2023-05-05 NOTE — TELEPHONE ENCOUNTER
Called and confirmed pharmacy received medication. It will be ready for  tomorrow and I called to advise the patient, left VM.

## 2023-05-05 NOTE — TELEPHONE ENCOUNTER
Please contact pt and tell him, if it is requiring a PA, we can increase the dosage to 60 mg.  I will send it in today before he goes out of town.  I have never, had this happen with the medication. He can let me, know if that is a problem.

## 2023-05-10 RX ORDER — AZILSARTAN KAMEDOXOMIL AND CHLORTHALIDONE 40; 25 MG/1; MG/1
1 TABLET ORAL DAILY
Qty: 90 TABLET | Refills: 2 | Status: SHIPPED | OUTPATIENT
Start: 2023-05-10

## 2023-05-25 ENCOUNTER — OFFICE VISIT (OUTPATIENT)
Dept: CARDIOLOGY | Facility: CLINIC | Age: 48
End: 2023-05-25
Payer: COMMERCIAL

## 2023-05-25 VITALS
OXYGEN SATURATION: 96 % | HEART RATE: 89 BPM | DIASTOLIC BLOOD PRESSURE: 92 MMHG | BODY MASS INDEX: 41.44 KG/M2 | WEIGHT: 296 LBS | HEIGHT: 71 IN | SYSTOLIC BLOOD PRESSURE: 142 MMHG

## 2023-05-25 DIAGNOSIS — I77.89 CORONARY ARTERY ECTASIA: Primary | ICD-10-CM

## 2023-05-25 DIAGNOSIS — E78.2 MIXED HYPERLIPIDEMIA: ICD-10-CM

## 2023-05-25 DIAGNOSIS — I10 ESSENTIAL HYPERTENSION: ICD-10-CM

## 2023-05-25 DIAGNOSIS — I10 PRIMARY HYPERTENSION: ICD-10-CM

## 2023-05-25 PROCEDURE — 99214 OFFICE O/P EST MOD 30 MIN: CPT | Performed by: INTERNAL MEDICINE

## 2023-05-25 RX ORDER — AMLODIPINE BESYLATE 10 MG/1
10 TABLET ORAL DAILY
Qty: 90 TABLET | Refills: 3 | Status: SHIPPED | OUTPATIENT
Start: 2023-05-25

## 2023-05-25 RX ORDER — CLOPIDOGREL BISULFATE 75 MG/1
75 TABLET ORAL DAILY
Qty: 90 TABLET | Refills: 3 | Status: SHIPPED | OUTPATIENT
Start: 2023-05-25

## 2023-05-25 NOTE — PROGRESS NOTES
Cardiology Office Visit      Encounter Date:  05/25/2023    Patient ID:   Kemal Dumont is a 47 y.o. male.    Reason For Followup:  Coronary ectasia    Brief Clinical History:  Dear Dr. Ellis, CARIDAD Gilliam    I had the pleasure of seeing Kemal Dumont today. As you are well aware, this is a 47 y.o. male with no established history of ischemic heart disease.  He does have a history of coronary ectasia.  He underwent cardiac catheterization in January 2020 secondary to an abnormal treadmill with nonsustained ventricular tachycardia.  He was found to have significant coronary ectasia involving the RCA, LAD, and circumflex vessels.  He has additional history that includes hypertension, hyperlipidemia, and morbid obesity.  He presents today for follow-up on the above conditions.    Interval History:  He denies any chest pain pressure heaviness or tightness.  He denies any shortness of breath out of character.  He denies any PND orthopnea.  He denies any syncope or near syncope.  He reports feeling well from a cardiac perspective.    His blood pressures demonstrated remarkable improvement.  Although his blood pressure is elevated today in the office his home readings have been below 140/90 predominantly.    If you will recall, the patient underwent a cardiac catheterization in January 2020.  This was secondary to nonsustained ventricular tachycardia revealed on stress testing.  No angiographic occlusive disease was noted however he did have significant coronary ectasia which prompted the utilization of antiplatelet therapy.    He has started Repatha for lipid management.  We will check his lipids at his next visit.  Statins were not an option due to a history of abnormal LFTs while taking statins.    Assessment & Plan    Impressions:  Severe coronary ectasia  Nonsustained ventricular tachycardia on treadmill stress test with no evidence of occlusive disease on catheterization January 2020  Antiplatelet  "therapy  Hyperlipidemia     Statin intolerant secondary to abnormal LFTs  Hypertension  Adverse reaction to ACE inhibitors-cough    Recommendations:  Continuation of his current cardiovascular regimen at the present time.     This includes antihypertensives, antiplatelet therapy, and Repatha  Point-of-care lipids at next visit  Follow-up in 6 months time sooner should there be difficulties    Diagnoses and all orders for this visit:    1. Coronary artery ectasia (Primary)    2. Mixed hyperlipidemia    3. Primary hypertension    4. Essential hypertension  -     amLODIPine (NORVASC) 10 MG tablet; Take 1 tablet by mouth Daily.  Dispense: 90 tablet; Refill: 3    Other orders  -     clopidogrel (Plavix) 75 MG tablet; Take 1 tablet by mouth Daily.  Dispense: 90 tablet; Refill: 3          Objective:    Vitals:  Vitals:    05/25/23 0907   BP: 142/92   BP Location: Left arm   Patient Position: Sitting   Pulse: 89   SpO2: 96%   Weight: 134 kg (296 lb)   Height: 180.3 cm (71\")     Body mass index is 41.28 kg/m².      Physical Exam:    General: Alert, cooperative, no distress, appears stated age  Head:  Normocephalic, atraumatic, mucous membranes moist  Eyes:  Conjunctiva/corneas clear, EOM's intact     Neck:  Supple,  no bruit    Lungs: Clear to auscultation bilaterally, no wheezes rhonchi rales are noted  Chest wall: No tenderness  Heart::  Regular rate and rhythm, S1 and S2 normal, 1/6 holosystolic murmur.  No rub or gallop  Abdomen: Soft, non-tender, nondistended bowel sounds active.  Obese  Extremities: No cyanosis, clubbing, or edema  Pulses: 2+ and symmetric all extremities  Skin:  No rashes or lesions  Neuro/psych: A&O x3. CN II through XII are grossly intact with appropriate affect      Allergies:  Allergies   Allergen Reactions   • Ace Inhibitors Cough   • Valsartan Cough       Medication Review:     Current Outpatient Medications:   •  amLODIPine (NORVASC) 10 MG tablet, Take 1 tablet by mouth Daily., Disp: 90 tablet, " Rfl: 3  •  aspirin (aspirin) 81 MG EC tablet, Take 1 tablet by mouth Daily., Disp: 90 tablet, Rfl: 3  •  Azilsartan-Chlorthalidone (Edarbyclor) 40-25 MG tablet, Take 1 tablet by mouth Daily., Disp: 90 tablet, Rfl: 2  •  clopidogrel (Plavix) 75 MG tablet, Take 1 tablet by mouth Daily., Disp: 90 tablet, Rfl: 3  •  coenzyme Q10 100 MG capsule, Take 1 capsule by mouth Daily., Disp: , Rfl:   •  DULoxetine (CYMBALTA) 60 MG capsule, Take 1 capsule by mouth Daily. Increase dosage., Disp: 30 capsule, Rfl: 1  •  Evolocumab (REPATHA) solution prefilled syringe injection, Inject 1 mL under the skin into the appropriate area as directed Every 14 (Fourteen) Days., Disp: 1 mL, Rfl: 6  •  Lactobacillus (PROBIOTIC ACIDOPHILUS PO), Take  by mouth., Disp: , Rfl:   •  MAGNESIUM PO, Take  by mouth. At bedtime for sleep, Disp: , Rfl:   •  multivitamin with minerals tablet tablet, Take 1 tablet by mouth Daily., Disp: , Rfl:   •  Omega-3 Fatty Acids (fish oil) 1000 MG capsule capsule, Take  by mouth Daily With Breakfast., Disp: , Rfl:     Family History:  Family History   Problem Relation Age of Onset   • Diabetes Father    • Hypertension Father    • Hyperlipidemia Father    • Other Father         lung/respiratory disease    • Stroke Father    • Colon polyps Father    • Diabetes Maternal Grandmother    • Lung cancer Maternal Grandfather    • Liver cancer Maternal Grandfather    • Heart disease Paternal Grandfather    • Colon cancer Maternal Uncle        Past Medical History:  Past Medical History:   Diagnosis Date   • Allergic rhinitis    • Anserine bursitis l    left   • Depression    • History of tobacco use    • Hyperlipidemia    • Hypertension    • Left shoulder strain    • VT (ventricular tachycardia) 01/13/2020    Added automatically from request for surgery [id number]       Past Surgical History:  Past Surgical History:   Procedure Laterality Date   • ANKLE ARTHROSCOPY Right 11/2008   • CARDIAC CATHETERIZATION N/A 01/14/2020     Procedure: Left Heart Cath;  Surgeon: Micheal Mejias DO;  Location: [facility] CATH INVASIVE LOCATION;  Service: Cardiology   • COLONOSCOPY     • TONSILLECTOMY         Social History:  Social History     Socioeconomic History   • Marital status:    Tobacco Use   • Smoking status: Never   • Smokeless tobacco: Never   • Tobacco comments:     socially while in high school    Vaping Use   • Vaping Use: Never used   Substance and Sexual Activity   • Alcohol use: Yes     Alcohol/week: 8.0 standard drinks     Types: 8 Glasses of wine per week     Comment: 3 x wk / [place], wine, occasional beer    • Drug use: Never     Types: Marijuana   • Sexual activity: Yes     Partners: Female     Birth control/protection: Surgical       Review of Systems:  The following systems were reviewed as they relate to the cardiovascular system: Constitutional, Eyes, ENT, Cardiovascular, Respiratory, Gastrointestinal, Integumentary, Neurological, Psychiatric, Hematologic, Endocrine, Musculoskeletal, and Genitourinary. The pertinent cardiovascular findings are reported above with all other cardiovascular points within those systems being negative.    Diagnostic Study Review:     Current Electrocardiogram:  Procedures no new EKG.  EKG dated 3/30/2023 demonstrates sinus rhythm with a ventricular to 76 bpm.    Laboratory Data:  Lab Results   Component Value Date    GLUCOSE 97 04/04/2023    BUN 20 04/04/2023    CREATININE 1.03 04/04/2023    EGFRIFNONA 93 08/10/2021    EGFRIFAFRI 107 08/10/2021    BCR 19 04/04/2023    K 4.7 04/04/2023    CO2 20 04/04/2023    CALCIUM 10.2 04/04/2023    PROTENTOTREF 7.1 04/04/2023    ALBUMIN 4.5 04/04/2023    LABIL2 1.7 04/04/2023     (H) 04/04/2023     (H) 04/04/2023     Lab Results   Component Value Date    GLUCOSE 97 04/04/2023    CALCIUM 10.2 04/04/2023     04/04/2023    K 4.7 04/04/2023    CO2 20 04/04/2023     04/04/2023    BUN 20 04/04/2023    CREATININE 1.03 04/04/2023     EGFRIFAFRI 107 08/10/2021    EGFRIFNONA 93 08/10/2021    BCR 19 04/04/2023    ANIONGAP 9.0 01/14/2020     Lab Results   Component Value Date    WBC 7.7 06/28/2022    HGB 17.8 (H) 06/28/2022    HCT 54.2 (H) 06/28/2022    MCV 88 06/28/2022     06/28/2022     Lab Results   Component Value Date    CHOL 170 05/08/2019    CHLPL 254 (H) 03/22/2023    TRIG 198 (H) 03/22/2023    HDL 39 (L) 03/22/2023     (H) 03/22/2023     Lab Results   Component Value Date    HGBA1C 5.5 04/04/2023     No results found for: INR, PROTIME    Most Recent Echo:       Most Recent Stress Test:  Results for orders placed during the hospital encounter of 01/13/20    Stress Test With Myocardial Perfusion One Day    Interpretation Summary  · Findings consistent with an abnormal ECG stress test secondary to nonsustained ventricular tachycardia occurring at peak exercise  · No scintigraphic evidence for provokable ischemia  · Fixed inferobasilar defect likely secondary to technical factors  · Left ventricular ejection fraction is normal (Calculated EF = 58%).  · Impressions are consistent with a low risk study.  · There is no prior study available for comparison.  · Cardiac catheterization recommended secondary to the presence of nonsustained ventricular tachycardia    Stress portion of this exam was supervised by: Najma Rivera NP       Most Recent Cardiac Catheterization:   Results for orders placed during the hospital encounter of 01/13/20    Cardiac Catheterization/Vascular Study    Narrative  Cardiac Catheterization Operative Report    Kemal DES Tim  [id number]  1/14/2020  @PCP@    He underwent left heart catheterization with selective coronary angiography, left ventriculography, right common iliac angiography.    Indications for the procedure include: abnormal stress test.    Procedure Details:  The risks, benefits, complications, treatment options, and expected outcomes were discussed with the patient. The patient and/or  family concurred with the proposed plan, giving informed consent.    After informed consent the patient was brought to the cath lab after appropriate IV hydration was begun and oral premedication was given. He was further sedated with fentanyl and midazolam. He was prepped and draped in the usual manner. Using the modified Seldinger access technique, a 6 [place] sheath was placed in the femoral artery. A left heart catheterization with coronary arteriography was performed. Findings are discussed below.    After the procedure was completed, sedation was stopped and the sheaths and catheters were all removed. Hemostasis was achieved per established hospital protocols.    Findings:    Hemodynamics  LVEDP: 6  LV: 100/0  AO: 100/70  Left Main  very large, no significant disease  RCA  very large, ectatic, no occlusive disease  LAD  very large, ectatic no occlusive disease  Circ  very large, ectatic no occlusive disease  SVG(s)  not applicable  ANTONIO  not applicable  LV  normal LV systolic function EF 55%  Coronary Dominance  RCA    Estimated Blood Loss:  Minimal    Complications:  None; patient tolerated the procedure well.    Disposition: PACU - hemodynamically stable    Condition: stable    Impressions:  No angiographic evidence for significant epicardial occlusive disease  Very large and ectatic coronary arteries with sluggish flow  Normal left ventricular systolic function ejection fraction 55%    Recommendations:  Add Plavix to daily aspirin  Aggressively treat risk factors  Cardiac status otherwise appropriate for discharge after bedrest.  Follow-up 2 weeks       NOTE: The following portions of the patient's note were reviewed, confirmed and/or updated this visit as appropriate: History of present illness/Interval history, physical examination, assessment & plan, allergies, current medications, past family history, past medical history, past social history, past surgical history and problem list.

## 2023-05-25 NOTE — LETTER
May 26, 2023     CARIDAD Cee  [address]  [place] [address]    Patient: Kemal Dumont   YOB: 1975   Date of Visit: 5/25/2023       Dear CARIDAD Alejandra:    Thank you for referring Kemal Dumont to me for evaluation. Below are the relevant portions of my assessment and plan of care.    If you have questions, please do not hesitate to call me. I look forward to following Kemal along with you.         Sincerely,        Micheal Mejias DO        CC: No Recipients    Micheal Mejias DO  05/26/23 1935  Signed  Cardiology Office Visit      Encounter Date:  05/25/2023    Patient ID:   Kemal Dumont is a 47 y.o. male.    Reason For Followup:  Coronary ectasia    Brief Clinical History:  Dear Brooke Alejandra APRN    I had the pleasure of seeing Kemal Dumont today. As you are well aware, this is a 47 y.o. male with no established history of ischemic heart disease.  He does have a history of coronary ectasia.  He underwent cardiac catheterization in January 2020 secondary to an abnormal treadmill with nonsustained ventricular tachycardia.  He was found to have significant coronary ectasia involving the RCA, LAD, and circumflex vessels.  He has additional history that includes hypertension, hyperlipidemia, and morbid obesity.  He presents today for follow-up on the above conditions.    Interval History:  He denies any chest pain pressure heaviness or tightness.  He denies any shortness of breath out of character.  He denies any PND orthopnea.  He denies any syncope or near syncope.  He reports feeling well from a cardiac perspective.    His blood pressures demonstrated remarkable improvement.  Although his blood pressure is elevated today in the office his home readings have been below 140/90 predominantly.    If you will recall, the patient underwent a cardiac catheterization in January 2020.  This was secondary to nonsustained ventricular  "tachycardia revealed on stress testing.  No angiographic occlusive disease was noted however he did have significant coronary ectasia which prompted the utilization of antiplatelet therapy.    He has started Repatha for lipid management.  We will check his lipids at his next visit.  Statins were not an option due to a history of abnormal LFTs while taking statins.    Assessment & Plan    Impressions:  Severe coronary ectasia  Nonsustained ventricular tachycardia on treadmill stress test with no evidence of occlusive disease on catheterization January 2020  Antiplatelet therapy  Hyperlipidemia     Statin intolerant secondary to abnormal LFTs  Hypertension  Adverse reaction to ACE inhibitors-cough    Recommendations:  Continuation of his current cardiovascular regimen at the present time.     This includes antihypertensives, antiplatelet therapy, and Repatha  Point-of-care lipids at next visit  Follow-up in 6 months time sooner should there be difficulties    Diagnoses and all orders for this visit:    1. Coronary artery ectasia (Primary)    2. Mixed hyperlipidemia    3. Primary hypertension    4. Essential hypertension  -     amLODIPine (NORVASC) 10 MG tablet; Take 1 tablet by mouth Daily.  Dispense: 90 tablet; Refill: 3    Other orders  -     clopidogrel (Plavix) 75 MG tablet; Take 1 tablet by mouth Daily.  Dispense: 90 tablet; Refill: 3          Objective:    Vitals:  Vitals:    05/25/23 0907   BP: 142/92   BP Location: Left arm   Patient Position: Sitting   Pulse: 89   SpO2: 96%   Weight: 134 kg (296 lb)   Height: 180.3 cm (71\")     Body mass index is 41.28 kg/m².      Physical Exam:    General: Alert, cooperative, no distress, appears stated age  Head:  Normocephalic, atraumatic, mucous membranes moist  Eyes:  Conjunctiva/corneas clear, EOM's intact     Neck:  Supple,  no bruit    Lungs: Clear to auscultation bilaterally, no wheezes rhonchi rales are noted  Chest wall: No tenderness  Heart::  Regular rate and " rhythm, S1 and S2 normal, 1/6 holosystolic murmur.  No rub or gallop  Abdomen: Soft, non-tender, nondistended bowel sounds active.  Obese  Extremities: No cyanosis, clubbing, or edema  Pulses: 2+ and symmetric all extremities  Skin:  No rashes or lesions  Neuro/psych: A&O x3. CN II through XII are grossly intact with appropriate affect      Allergies:  Allergies   Allergen Reactions   • Ace Inhibitors Cough   • Valsartan Cough       Medication Review:     Current Outpatient Medications:   •  amLODIPine (NORVASC) 10 MG tablet, Take 1 tablet by mouth Daily., Disp: 90 tablet, Rfl: 3  •  aspirin (aspirin) 81 MG EC tablet, Take 1 tablet by mouth Daily., Disp: 90 tablet, Rfl: 3  •  Azilsartan-Chlorthalidone (Edarbyclor) 40-25 MG tablet, Take 1 tablet by mouth Daily., Disp: 90 tablet, Rfl: 2  •  clopidogrel (Plavix) 75 MG tablet, Take 1 tablet by mouth Daily., Disp: 90 tablet, Rfl: 3  •  coenzyme Q10 100 MG capsule, Take 1 capsule by mouth Daily., Disp: , Rfl:   •  DULoxetine (CYMBALTA) 60 MG capsule, Take 1 capsule by mouth Daily. Increase dosage., Disp: 30 capsule, Rfl: 1  •  Evolocumab (REPATHA) solution prefilled syringe injection, Inject 1 mL under the skin into the appropriate area as directed Every 14 (Fourteen) Days., Disp: 1 mL, Rfl: 6  •  Lactobacillus (PROBIOTIC ACIDOPHILUS PO), Take  by mouth., Disp: , Rfl:   •  MAGNESIUM PO, Take  by mouth. At bedtime for sleep, Disp: , Rfl:   •  multivitamin with minerals tablet tablet, Take 1 tablet by mouth Daily., Disp: , Rfl:   •  Omega-3 Fatty Acids (fish oil) 1000 MG capsule capsule, Take  by mouth Daily With Breakfast., Disp: , Rfl:     Family History:  Family History   Problem Relation Age of Onset   • Diabetes Father    • Hypertension Father    • Hyperlipidemia Father    • Other Father         lung/respiratory disease    • Stroke Father    • Colon polyps Father    • Diabetes Maternal Grandmother    • Lung cancer Maternal Grandfather    • Liver cancer Maternal  Grandfather    • Heart disease Paternal Grandfather    • Colon cancer Maternal Uncle        Past Medical History:  Past Medical History:   Diagnosis Date   • Allergic rhinitis    • Anserine bursitis l    left   • Depression    • History of tobacco use    • Hyperlipidemia    • Hypertension    • Left shoulder strain    • VT (ventricular tachycardia) 01/13/2020    Added automatically from request for surgery [id number]       Past Surgical History:  Past Surgical History:   Procedure Laterality Date   • ANKLE ARTHROSCOPY Right 11/2008   • CARDIAC CATHETERIZATION N/A 01/14/2020    Procedure: Left Heart Cath;  Surgeon: Micheal Mejias DO;  Location: [facility] CATH INVASIVE LOCATION;  Service: Cardiology   • COLONOSCOPY     • TONSILLECTOMY         Social History:  Social History     Socioeconomic History   • Marital status:    Tobacco Use   • Smoking status: Never   • Smokeless tobacco: Never   • Tobacco comments:     socially while in high school    Vaping Use   • Vaping Use: Never used   Substance and Sexual Activity   • Alcohol use: Yes     Alcohol/week: 8.0 standard drinks     Types: 8 Glasses of wine per week     Comment: 3 x wk / [place], wine, occasional beer    • Drug use: Never     Types: Marijuana   • Sexual activity: Yes     Partners: Female     Birth control/protection: Surgical       Review of Systems:  The following systems were reviewed as they relate to the cardiovascular system: Constitutional, Eyes, ENT, Cardiovascular, Respiratory, Gastrointestinal, Integumentary, Neurological, Psychiatric, Hematologic, Endocrine, Musculoskeletal, and Genitourinary. The pertinent cardiovascular findings are reported above with all other cardiovascular points within those systems being negative.    Diagnostic Study Review:     Current Electrocardiogram:  Procedures no new EKG.  EKG dated 3/30/2023 demonstrates sinus rhythm with a ventricular to 76 bpm.    Laboratory Data:  Lab Results   Component Value Date     GLUCOSE 97 04/04/2023    BUN 20 04/04/2023    CREATININE 1.03 04/04/2023    EGFRIFNONA 93 08/10/2021    EGFRIFAFRI 107 08/10/2021    BCR 19 04/04/2023    K 4.7 04/04/2023    CO2 20 04/04/2023    CALCIUM 10.2 04/04/2023    PROTENTOTREF 7.1 04/04/2023    ALBUMIN 4.5 04/04/2023    LABIL2 1.7 04/04/2023     (H) 04/04/2023     (H) 04/04/2023     Lab Results   Component Value Date    GLUCOSE 97 04/04/2023    CALCIUM 10.2 04/04/2023     04/04/2023    K 4.7 04/04/2023    CO2 20 04/04/2023     04/04/2023    BUN 20 04/04/2023    CREATININE 1.03 04/04/2023    EGFRIFAFRI 107 08/10/2021    EGFRIFNONA 93 08/10/2021    BCR 19 04/04/2023    ANIONGAP 9.0 01/14/2020     Lab Results   Component Value Date    WBC 7.7 06/28/2022    HGB 17.8 (H) 06/28/2022    HCT 54.2 (H) 06/28/2022    MCV 88 06/28/2022     06/28/2022     Lab Results   Component Value Date    CHOL 170 05/08/2019    CHLPL 254 (H) 03/22/2023    TRIG 198 (H) 03/22/2023    HDL 39 (L) 03/22/2023     (H) 03/22/2023     Lab Results   Component Value Date    HGBA1C 5.5 04/04/2023     No results found for: INR, PROTIME    Most Recent Echo:       Most Recent Stress Test:  Results for orders placed during the hospital encounter of 01/13/20    Stress Test With Myocardial Perfusion One Day    Interpretation Summary  · Findings consistent with an abnormal ECG stress test secondary to nonsustained ventricular tachycardia occurring at peak exercise  · No scintigraphic evidence for provokable ischemia  · Fixed inferobasilar defect likely secondary to technical factors  · Left ventricular ejection fraction is normal (Calculated EF = 58%).  · Impressions are consistent with a low risk study.  · There is no prior study available for comparison.  · Cardiac catheterization recommended secondary to the presence of nonsustained ventricular tachycardia    Stress portion of this exam was supervised by: Najma Rivera NP       Most Recent Cardiac  Catheterization:   Results for orders placed during the hospital encounter of 01/13/20    Cardiac Catheterization/Vascular Study    Narrative  Cardiac Catheterization Operative Report    Kemal Dumont  [id number]  1/14/2020  @PCP@    He underwent left heart catheterization with selective coronary angiography, left ventriculography, right common iliac angiography.    Indications for the procedure include: abnormal stress test.    Procedure Details:  The risks, benefits, complications, treatment options, and expected outcomes were discussed with the patient. The patient and/or family concurred with the proposed plan, giving informed consent.    After informed consent the patient was brought to the cath lab after appropriate IV hydration was begun and oral premedication was given. He was further sedated with fentanyl and midazolam. He was prepped and draped in the usual manner. Using the modified Seldinger access technique, a 6 [place] sheath was placed in the femoral artery. A left heart catheterization with coronary arteriography was performed. Findings are discussed below.    After the procedure was completed, sedation was stopped and the sheaths and catheters were all removed. Hemostasis was achieved per established hospital protocols.    Findings:    Hemodynamics  LVEDP: 6  LV: 100/0  AO: 100/70  Left Main  very large, no significant disease  RCA  very large, ectatic, no occlusive disease  LAD  very large, ectatic no occlusive disease  Circ  very large, ectatic no occlusive disease  SVG(s)  not applicable  ANTONIO  not applicable  LV  normal LV systolic function EF 55%  Coronary Dominance  RCA    Estimated Blood Loss:  Minimal    Complications:  None; patient tolerated the procedure well.    Disposition: PACU - hemodynamically stable    Condition: stable    Impressions:  No angiographic evidence for significant epicardial occlusive disease  Very large and ectatic coronary arteries with sluggish flow  Normal left  ventricular systolic function ejection fraction 55%    Recommendations:  Add Plavix to daily aspirin  Aggressively treat risk factors  Cardiac status otherwise appropriate for discharge after bedrest.  Follow-up 2 weeks       NOTE: The following portions of the patient's note were reviewed, confirmed and/or updated this visit as appropriate: History of present illness/Interval history, physical examination, assessment & plan, allergies, current medications, past family history, past medical history, past social history, past surgical history and problem list.

## 2023-06-09 DIAGNOSIS — I10 ESSENTIAL HYPERTENSION: ICD-10-CM

## 2023-06-09 RX ORDER — AMLODIPINE BESYLATE 10 MG/1
10 TABLET ORAL DAILY
Qty: 90 TABLET | Refills: 3 | Status: SHIPPED | OUTPATIENT
Start: 2023-06-09

## 2023-06-09 RX ORDER — CLOPIDOGREL BISULFATE 75 MG/1
75 TABLET ORAL DAILY
Qty: 90 TABLET | Refills: 3 | Status: SHIPPED | OUTPATIENT
Start: 2023-06-09

## 2023-06-11 RX ORDER — DULOXETIN HYDROCHLORIDE 60 MG/1
60 CAPSULE, DELAYED RELEASE ORAL DAILY
Qty: 90 CAPSULE | Refills: 2 | Status: SHIPPED | OUTPATIENT
Start: 2023-06-11

## 2023-06-15 NOTE — TELEPHONE ENCOUNTER
Patient  requesting RX for repatha be sent to [facility] is taking too long to process and is tired of dealing with them

## 2023-08-02 ENCOUNTER — OFFICE (OUTPATIENT)
Dept: URBAN - METROPOLITAN AREA CLINIC 64 | Facility: CLINIC | Age: 48
End: 2023-08-02

## 2023-08-02 VITALS — HEIGHT: 71 IN | WEIGHT: 295 LBS

## 2023-08-02 DIAGNOSIS — R74.8 ABNORMAL LEVELS OF OTHER SERUM ENZYMES: ICD-10-CM

## 2023-08-02 DIAGNOSIS — K76.0 FATTY (CHANGE OF) LIVER, NOT ELSEWHERE CLASSIFIED: ICD-10-CM

## 2023-08-02 PROCEDURE — 99213 OFFICE O/P EST LOW 20 MIN: CPT

## 2023-12-26 RX ORDER — EVOLOCUMAB 140 MG/ML
INJECTION, SOLUTION SUBCUTANEOUS
Qty: 1 ML | Refills: 6 | Status: SHIPPED | OUTPATIENT
Start: 2023-12-26

## 2024-01-09 ENCOUNTER — TELEPHONE (OUTPATIENT)
Dept: CARDIOLOGY | Facility: CLINIC | Age: 49
End: 2024-01-09

## 2024-01-16 RX ORDER — AZILSARTAN KAMEDOXOMIL AND CHLORTHALIDONE 40; 25 MG/1; MG/1
1 TABLET ORAL DAILY
Qty: 90 TABLET | Refills: 2 | Status: SHIPPED | OUTPATIENT
Start: 2024-01-16

## 2024-01-26 NOTE — TELEPHONE ENCOUNTER
Addended by: MARIZA WHITING on: 1/26/2024 10:10 AM     Modules accepted: Orders     lvm letting patient know he will need an office visit in order to have meds refilled again

## 2024-05-24 DIAGNOSIS — I77.89 CORONARY ARTERY ECTASIA: Primary | ICD-10-CM

## 2024-06-05 ENCOUNTER — OFFICE VISIT (OUTPATIENT)
Dept: CARDIOLOGY | Facility: CLINIC | Age: 49
End: 2024-06-05
Payer: COMMERCIAL

## 2024-06-05 VITALS
HEIGHT: 71 IN | SYSTOLIC BLOOD PRESSURE: 136 MMHG | BODY MASS INDEX: 36.4 KG/M2 | HEART RATE: 96 BPM | OXYGEN SATURATION: 97 % | WEIGHT: 260 LBS | DIASTOLIC BLOOD PRESSURE: 90 MMHG

## 2024-06-05 DIAGNOSIS — I77.89 CORONARY ARTERY ECTASIA: Primary | ICD-10-CM

## 2024-06-05 DIAGNOSIS — I10 PRIMARY HYPERTENSION: ICD-10-CM

## 2024-06-05 DIAGNOSIS — E78.2 MIXED HYPERLIPIDEMIA: ICD-10-CM

## 2024-06-05 LAB
ALBUMIN SERPL-MCNC: 4.2 G/DL (ref 4.1–5.1)
ALBUMIN/GLOB SERPL: 1.6 {RATIO} (ref 1.2–2.2)
ALP SERPL-CCNC: 46 IU/L (ref 44–121)
ALT SERPL-CCNC: 49 IU/L (ref 0–44)
AMBIG ABBREV CMP14 DEFAULT: NORMAL
AMBIG ABBREV LP DEFAULT: NORMAL
AST SERPL-CCNC: 23 IU/L (ref 0–40)
BASOPHILS # BLD AUTO: 0.1 X10E3/UL (ref 0–0.2)
BASOPHILS NFR BLD AUTO: 1 %
BILIRUB SERPL-MCNC: 0.5 MG/DL (ref 0–1.2)
BUN SERPL-MCNC: 17 MG/DL (ref 6–24)
BUN/CREAT SERPL: 18 (ref 9–20)
CALCIUM SERPL-MCNC: 9.8 MG/DL (ref 8.7–10.2)
CHLORIDE SERPL-SCNC: 99 MMOL/L (ref 96–106)
CHOLEST SERPL-MCNC: 195 MG/DL (ref 100–199)
CO2 SERPL-SCNC: 26 MMOL/L (ref 20–29)
CREAT SERPL-MCNC: 0.95 MG/DL (ref 0.76–1.27)
EGFRCR SERPLBLD CKD-EPI 2021: 99 ML/MIN/1.73
EOSINOPHIL # BLD AUTO: 0.1 X10E3/UL (ref 0–0.4)
EOSINOPHIL NFR BLD AUTO: 2 %
ERYTHROCYTE [DISTWIDTH] IN BLOOD BY AUTOMATED COUNT: 17.6 % (ref 11.6–15.4)
GLOBULIN SER CALC-MCNC: 2.6 G/DL (ref 1.5–4.5)
GLUCOSE SERPL-MCNC: 85 MG/DL (ref 70–99)
HCT VFR BLD AUTO: 47.9 % (ref 37.5–51)
HDLC SERPL-MCNC: 44 MG/DL
HGB BLD-MCNC: 15.2 G/DL (ref 13–17.7)
IMM GRANULOCYTES # BLD AUTO: 0 X10E3/UL (ref 0–0.1)
IMM GRANULOCYTES NFR BLD AUTO: 0 %
LDLC SERPL CALC-MCNC: 119 MG/DL (ref 0–99)
LYMPHOCYTES # BLD AUTO: 2.2 X10E3/UL (ref 0.7–3.1)
LYMPHOCYTES NFR BLD AUTO: 25 %
MCH RBC QN AUTO: 25.8 PG (ref 26.6–33)
MCHC RBC AUTO-ENTMCNC: 31.7 G/DL (ref 31.5–35.7)
MCV RBC AUTO: 81 FL (ref 79–97)
MONOCYTES # BLD AUTO: 0.7 X10E3/UL (ref 0.1–0.9)
MONOCYTES NFR BLD AUTO: 8 %
NEUTROPHILS # BLD AUTO: 5.8 X10E3/UL (ref 1.4–7)
NEUTROPHILS NFR BLD AUTO: 64 %
PLATELET # BLD AUTO: 329 X10E3/UL (ref 150–450)
POTASSIUM SERPL-SCNC: 3.9 MMOL/L (ref 3.5–5.2)
PROT SERPL-MCNC: 6.8 G/DL (ref 6–8.5)
RBC # BLD AUTO: 5.89 X10E6/UL (ref 4.14–5.8)
SODIUM SERPL-SCNC: 137 MMOL/L (ref 134–144)
TRIGL SERPL-MCNC: 184 MG/DL (ref 0–149)
VLDLC SERPL CALC-MCNC: 32 MG/DL (ref 5–40)
WBC # BLD AUTO: 8.9 X10E3/UL (ref 3.4–10.8)

## 2024-06-05 PROCEDURE — 99214 OFFICE O/P EST MOD 30 MIN: CPT | Performed by: INTERNAL MEDICINE

## 2024-06-05 PROCEDURE — 93000 ELECTROCARDIOGRAM COMPLETE: CPT | Performed by: INTERNAL MEDICINE

## 2024-06-05 NOTE — PROGRESS NOTES
Cardiology Office Visit      Encounter Date:  06/05/2024    Patient ID:   Kemal Dumont is a 48 y.o. male.    Reason For Followup:  Coronary ectasia    Brief Clinical History:  Dear Dr. Ellis, CARIDAD Gilliam    I had the pleasure of seeing Kemal Dumont today. As you are well aware, this is a 48 y.o. male with no established history of ischemic heart disease.  He does have a history of coronary ectasia.  He underwent cardiac catheterization in January 2020 secondary to an abnormal treadmill with nonsustained ventricular tachycardia.  He was found to have significant coronary ectasia involving the RCA, LAD, and circumflex vessels.  He has additional history that includes hypertension, hyperlipidemia, and morbid obesity.  He presents today for follow-up on the above conditions.    Interval History:  He denies any chest pain pressure heaviness or tightness.  He denies any shortness of breath out of character.  He denies any PND orthopnea.  He denies any syncope or near syncope.  He reports feeling well from a cardiac perspective.    His blood pressures demonstrated remarkable improvement.  Although his blood pressure is elevated today in the office his home readings have been below 140/90 predominantly.    If you will recall, the patient underwent a cardiac catheterization in January 2020.  This was secondary to nonsustained ventricular tachycardia revealed on stress testing.  No angiographic occlusive disease was noted however he did have significant coronary ectasia which prompted the utilization of antiplatelet therapy.    He has started Repatha for lipid management.  We will check his lipids at his next visit.  Statins were not an option due to a history of abnormal LFTs while taking statins.    06/05/2024        He reports that he started taking Ozempic and he feels better and his liver numbers are very good. He has lost 50#. Reviewed labs on phone but will get hard copies. [employer] dropped all group  "coverage for him and his office was left with huge insurance problem. He is on caresource now but will get a new offier. Blood pressure is elevatefd today but was 110 systolic a week ago.  Because of this we will hold off on making any adjustments to his antihypertensives.  It is likely that his blood pressures will continue to decline as his weight comes down.    He reports he has been out of Repatha.  He was given a coupon today for assistance.    Assessment & Plan    Impressions:  Severe coronary ectasia  Nonsustained ventricular tachycardia on treadmill stress test with no evidence of occlusive disease on catheterization January 2020  Antiplatelet therapy  Hyperlipidemia     Statin intolerant secondary to abnormal LFTs  Hypertension  Adverse reaction to ACE inhibitors-cough    Recommendations:  Continuation of his current cardiovascular regimen at the present time.     This includes antihypertensives, antiplatelet therapy, and Repatha  Follow-up in 6 months time sooner should there be difficulties    Diagnoses and all orders for this visit:    1. Coronary artery ectasia (Primary)  -     ECG 12 Lead    2. Mixed hyperlipidemia  -     ECG 12 Lead    3. Primary hypertension  -     ECG 12 Lead            Objective:    Vitals:  Vitals:    06/05/24 1410   BP: 136/90   BP Location: Left arm   Pulse: 96   SpO2: 97%   Weight: 118 kg (260 lb)   Height: 180.3 cm (71\")       Body mass index is 36.26 kg/m².      Physical Exam:    General: Alert, cooperative, no distress, appears stated age  Head:  Normocephalic, atraumatic, mucous membranes moist  Eyes:  Conjunctiva/corneas clear, EOM's intact     Neck:  Supple,  no bruit    Lungs: Clear to auscultation bilaterally, no wheezes rhonchi rales are noted  Chest wall: No tenderness  Heart::  Regular rate and rhythm, S1 and S2 normal, 1/6 holosystolic murmur.  No rub or gallop  Abdomen: Soft, non-tender, nondistended bowel sounds active.  Obese  Extremities: No cyanosis, clubbing, " or edema  Pulses: 2+ and symmetric all extremities  Skin:  No rashes or lesions  Neuro/psych: A&O x3. CN II through XII are grossly intact with appropriate affect      Allergies:  Allergies   Allergen Reactions    Ace Inhibitors Cough    Valsartan Cough       Medication Review:     Current Outpatient Medications:     amLODIPine (NORVASC) 10 MG tablet, Take 1 tablet by mouth Daily., Disp: 90 tablet, Rfl: 3    aspirin (aspirin) 81 MG EC tablet, Take 1 tablet by mouth Daily., Disp: 90 tablet, Rfl: 3    clopidogrel (Plavix) 75 MG tablet, Take 1 tablet by mouth Daily., Disp: 90 tablet, Rfl: 3    coenzyme Q10 100 MG capsule, Take 1 capsule by mouth Daily., Disp: , Rfl:     DULoxetine (CYMBALTA) 60 MG capsule, Take 1 capsule by mouth Daily. Increase dosage., Disp: 90 capsule, Rfl: 2    Edarbyclor 40-25 MG tablet, TAKE 1 TABLET BY MOUTH ONCE DAILY, Disp: 90 tablet, Rfl: 2    Lactobacillus (PROBIOTIC ACIDOPHILUS PO), Take  by mouth., Disp: , Rfl:     MAGNESIUM PO, Take  by mouth. At bedtime for sleep, Disp: , Rfl:     multivitamin with minerals tablet tablet, Take 1 tablet by mouth Daily., Disp: , Rfl:     Omega-3 Fatty Acids (fish oil) 1000 MG capsule capsule, Take  by mouth Daily With Breakfast., Disp: , Rfl:     Repatha solution prefilled syringe injection, INJECT 1ML UNDER THE SKIN INTO THE APPROPRIATE AREA AS DIRECTED EVERY 14 DAYS, Disp: 1 mL, Rfl: 6    Family History:  Family History   Problem Relation Age of Onset    Diabetes Father     Hypertension Father     Hyperlipidemia Father     Other Father         lung/respiratory disease     Stroke Father     Colon polyps Father     Diabetes Maternal Grandmother     Lung cancer Maternal Grandfather     Liver cancer Maternal Grandfather     Heart disease Paternal Grandfather     Colon cancer Maternal Uncle        Past Medical History:  Past Medical History:   Diagnosis Date    Allergic rhinitis     Anserine bursitis l    left    Depression     History of tobacco use      Hyperlipidemia     Hypertension     Left shoulder strain     VT (ventricular tachycardia) 01/13/2020    Added automatically from request for surgery [id number]       Past Surgical History:  Past Surgical History:   Procedure Laterality Date    ANKLE ARTHROSCOPY Right 11/2008    CARDIAC CATHETERIZATION N/A 01/14/2020    Procedure: Left Heart Cath;  Surgeon: Micheal Mejias DO;  Location: [facility] CATH INVASIVE LOCATION;  Service: Cardiology    COLONOSCOPY      TONSILLECTOMY         Social History:  Social History     Socioeconomic History    Marital status:    Tobacco Use    Smoking status: Never     Passive exposure: Never    Smokeless tobacco: Never    Tobacco comments:     socially while in high school    Vaping Use    Vaping status: Never Used   Substance and Sexual Activity    Alcohol use: Yes     Alcohol/week: 8.0 standard drinks of alcohol     Types: 8 Glasses of wine per week     Comment: 3 x wk / [place], wine, occasional beer     Drug use: Never     Types: Marijuana    Sexual activity: Yes     Partners: Female     Birth control/protection: Surgical       Review of Systems:  The following systems were reviewed as they relate to the cardiovascular system: Constitutional, Eyes, ENT, Cardiovascular, Respiratory, Gastrointestinal, Integumentary, Neurological, Psychiatric, Hematologic, Endocrine, Musculoskeletal, and Genitourinary. The pertinent cardiovascular findings are reported above with all other cardiovascular points within those systems being negative.    Diagnostic Study Review:     Current Electrocardiogram:    ECG 12 Lead    Date/Time: 6/5/2024 5:26 PM  Performed by: Micheal Mejias DO    Authorized by: Micheal Mejias DO  Comparison: not compared with previous ECG   Previous ECG: no previous ECG available  Comments: Normal sinus rhythm with a ventricular rate of 81 bpm.  Consider left atrial enlargement.  Age-indeterminate inferior MI.  Normal QT and QTc  "intervals.  Normal QRS axis          Laboratory Data:  Lab Results   Component Value Date    GLUCOSE 85 06/04/2024    BUN 17 06/04/2024    CREATININE 0.95 06/04/2024    EGFRIFNONA 93 08/10/2021    EGFRIFAFRI 107 08/10/2021    BCR 18 06/04/2024    K 3.9 06/04/2024    CO2 26 06/04/2024    CALCIUM 9.8 06/04/2024    PROTENTOTREF 6.8 06/04/2024    ALBUMIN 4.2 06/04/2024    LABIL2 1.6 06/04/2024    AST 23 06/04/2024    ALT 49 (H) 06/04/2024     Lab Results   Component Value Date    GLUCOSE 85 06/04/2024    CALCIUM 9.8 06/04/2024     06/04/2024    K 3.9 06/04/2024    CO2 26 06/04/2024    CL 99 06/04/2024    BUN 17 06/04/2024    CREATININE 0.95 06/04/2024    EGFRIFAFRI 107 08/10/2021    EGFRIFNONA 93 08/10/2021    BCR 18 06/04/2024    ANIONGAP 9.0 01/14/2020     Lab Results   Component Value Date    WBC 8.9 06/04/2024    HGB 15.2 06/04/2024    HCT 47.9 06/04/2024    MCV 81 06/04/2024     06/04/2024     Lab Results   Component Value Date    CHOL 170 05/08/2019    CHLPL 195 06/04/2024    TRIG 184 (H) 06/04/2024    HDL 44 06/04/2024     (H) 06/04/2024     Lab Results   Component Value Date    HGBA1C 5.5 04/04/2023     No results found for: \"INR\", \"PROTIME\"    Most Recent Echo:       Most Recent Stress Test:  Results for orders placed during the hospital encounter of 01/13/20    Stress Test With Myocardial Perfusion One Day    Interpretation Summary  · Findings consistent with an abnormal ECG stress test secondary to nonsustained ventricular tachycardia occurring at peak exercise  · No scintigraphic evidence for provokable ischemia  · Fixed inferobasilar defect likely secondary to technical factors  · Left ventricular ejection fraction is normal (Calculated EF = 58%).  · Impressions are consistent with a low risk study.  · There is no prior study available for comparison.  · Cardiac catheterization recommended secondary to the presence of nonsustained ventricular tachycardia    Stress portion of this exam " was supervised by: Najma Rivera NP       Most Recent Cardiac Catheterization:   Results for orders placed during the hospital encounter of 01/13/20    Cardiac Catheterization/Vascular Study    Narrative  Cardiac Catheterization Operative Report    Kemal Dumont  [id number]  1/14/2020  @PCP@    He underwent left heart catheterization with selective coronary angiography, left ventriculography, right common iliac angiography.    Indications for the procedure include: abnormal stress test.    Procedure Details:  The risks, benefits, complications, treatment options, and expected outcomes were discussed with the patient. The patient and/or family concurred with the proposed plan, giving informed consent.    After informed consent the patient was brought to the cath lab after appropriate IV hydration was begun and oral premedication was given. He was further sedated with fentanyl and midazolam. He was prepped and draped in the usual manner. Using the modified Seldinger access technique, a 6 Pashto sheath was placed in the femoral artery. A left heart catheterization with coronary arteriography was performed. Findings are discussed below.    After the procedure was completed, sedation was stopped and the sheaths and catheters were all removed. Hemostasis was achieved per established hospital protocols.    Findings:    Hemodynamics  LVEDP: 6  LV: 100/0  AO: 100/70  Left Main  very large, no significant disease  RCA  very large, ectatic, no occlusive disease  LAD  very large, ectatic no occlusive disease  Circ  very large, ectatic no occlusive disease  SVG(s)  not applicable  ANTONIO  not applicable  LV  normal LV systolic function EF 55%  Coronary Dominance  RCA    Estimated Blood Loss:  Minimal    Complications:  None; patient tolerated the procedure well.    Disposition: PACU - hemodynamically stable    Condition: stable    Impressions:  No angiographic evidence for significant epicardial occlusive disease  Very  "large and ectatic coronary arteries with sluggish flow  Normal left ventricular systolic function ejection fraction 55%    Recommendations:  Add Plavix to daily aspirin  Aggressively treat risk factors  Cardiac status otherwise appropriate for discharge after bedrest.  Follow-up 2 weeks       NOTE:     Today,06/05/2024 ,the following portions of the patient's note were reviewed, confirmed and/or updated as appropriate: History of present illness/Interval history, physical examination, assessment & plan, allergies, current medications, past family history, past medical history, past social history, past surgical history and problem list.    Labs pertinent to today's visit on 06/05/2024 (including but not limited to CBC, CMP, and lipid profiles) were requested from the patient's primary care provider/hospital/clinical laboratory.  If the labs were available for the visit, they were reviewed with the patient.  If they were not available, when received, special interest will be made to the labs pertinent to this visit.  The patient's most recent \"in-house\" labs are noted below and have been reviewed.  Outside labs pertinent to this visit are scanned into the record and have been reviewed.    Discussions held today, 06/05/2024,regarding procedures included risk, benefits, and options including but not limited to: Death, MI, stroke, pain, bleeding, infection, and possible need for vascular/thoracic/cardiothoracic surgery.    Copied information within this note was reviewed and is current as of 06/05/2024.    Assessment and plan noted herein represents the current plan of care as of 06/05/2024.    Significant resources from our office and staff are inherent in engaging this patient today,06/05/2024,and in a continuous and active collaborative plan of care related to their chronic cardiovascular conditions outlined herein.  The management of these conditions requires the direction of our service with specialized clinical " knowledge, skills, and experience.  This collaborative care includes but is not limited to patient education, expectations and responsibilities, shared decision making around therapeutic goals, and shared commitments to achieve those goals.

## 2024-06-11 ENCOUNTER — OFFICE VISIT (OUTPATIENT)
Dept: FAMILY MEDICINE CLINIC | Facility: CLINIC | Age: 49
End: 2024-06-11
Payer: COMMERCIAL

## 2024-06-11 VITALS
RESPIRATION RATE: 16 BRPM | OXYGEN SATURATION: 98 % | DIASTOLIC BLOOD PRESSURE: 87 MMHG | SYSTOLIC BLOOD PRESSURE: 134 MMHG | WEIGHT: 258 LBS | TEMPERATURE: 98.2 F | HEART RATE: 83 BPM | BODY MASS INDEX: 36.12 KG/M2 | HEIGHT: 71 IN

## 2024-06-11 DIAGNOSIS — Z00.00 ANNUAL PHYSICAL EXAM: Primary | ICD-10-CM

## 2024-06-11 DIAGNOSIS — R35.1 NOCTURIA: ICD-10-CM

## 2024-06-11 DIAGNOSIS — E78.2 MIXED HYPERLIPIDEMIA: ICD-10-CM

## 2024-06-11 DIAGNOSIS — I10 PRIMARY HYPERTENSION: ICD-10-CM

## 2024-06-11 DIAGNOSIS — E66.01 MORBIDLY OBESE: ICD-10-CM

## 2024-06-11 DIAGNOSIS — F33.1 MODERATE EPISODE OF RECURRENT MAJOR DEPRESSIVE DISORDER: ICD-10-CM

## 2024-06-11 DIAGNOSIS — I10 ESSENTIAL HYPERTENSION: ICD-10-CM

## 2024-06-11 DIAGNOSIS — K76.0 HEPATIC STEATOSIS: ICD-10-CM

## 2024-06-11 PROCEDURE — 99396 PREV VISIT EST AGE 40-64: CPT | Performed by: NURSE PRACTITIONER

## 2024-06-11 RX ORDER — DULOXETIN HYDROCHLORIDE 60 MG/1
60 CAPSULE, DELAYED RELEASE ORAL DAILY
Qty: 90 CAPSULE | Refills: 2 | Status: SHIPPED | OUTPATIENT
Start: 2024-06-11

## 2024-06-11 RX ORDER — AMLODIPINE BESYLATE 10 MG/1
10 TABLET ORAL DAILY
Qty: 90 TABLET | Refills: 3 | Status: SHIPPED | OUTPATIENT
Start: 2024-06-11

## 2024-06-11 RX ORDER — CLOPIDOGREL BISULFATE 75 MG/1
75 TABLET ORAL DAILY
Qty: 90 TABLET | Refills: 3 | Status: SHIPPED | OUTPATIENT
Start: 2024-06-11

## 2024-06-11 NOTE — PROGRESS NOTES
Chief Complaint  Annual Exam, Hypertension, and Hyperlipidemia    Subjective          Kemal Dumont presents to [facility] for patient is here for an annual physical recheck on depression hypertension hyperlipidemia    History of Present Illness    Patient is here for an annual physical.  He has seen cardiology and had labs.  We reviewed those today.  His lipids as well as his liver function have greatly improved.  He has been on Repatha until recently.  He also started Ozempic through an online telehealth visit.  He is lost about 40 pounds.  Over all he feels good.  He does very well with the Cymbalta.  Recently had a recall for some loss and discussed it with patient    He has no questions or problems that are new today.  Recently had a shoulder injury last fall is done well getting over that as well.    Occasionally has some nocturia.  No prostate history or prostate cancer history in the family.  Patient has not been exercising regularly.    Kemal Dumont  has a past medical history of Allergic (childhood), Allergic rhinitis, Anserine bursitis (l), Depression, History of tobacco use, Hyperlipidemia, Hypertension, Left shoulder strain, and VT (ventricular tachycardia) (01/13/2020).      Review of Systems   Constitutional:  Negative for fatigue.   HENT:  Negative for ear pain, sinus pain and sore throat.    Eyes:  Negative for pain.   Respiratory:  Negative for cough and shortness of breath.    Cardiovascular:  Negative for chest pain.   Gastrointestinal:  Negative for abdominal pain, diarrhea and nausea.   Endocrine: Negative for polyuria.   Genitourinary:  Negative for decreased urine volume and dysuria.   Musculoskeletal:  Negative for arthralgias.   Skin:  Negative for rash.   Allergic/Immunologic: Negative for environmental allergies.   Neurological:  Negative for dizziness, numbness and headaches.   Hematological:  Does not bruise/bleed easily.  "  Psychiatric/Behavioral:  The patient is not nervous/anxious.         Objective       Current Outpatient Medications:     aspirin (aspirin) 81 MG EC tablet, Take 1 tablet by mouth Daily., Disp: 90 tablet, Rfl: 3    coenzyme Q10 100 MG capsule, Take 1 capsule by mouth Daily., Disp: , Rfl:     DULoxetine (CYMBALTA) 60 MG capsule, Take 1 capsule by mouth Daily. Increase dosage., Disp: 90 capsule, Rfl: 2    Edarbyclor 40-25 MG tablet, TAKE 1 TABLET BY MOUTH ONCE DAILY, Disp: 90 tablet, Rfl: 2    Lactobacillus (PROBIOTIC ACIDOPHILUS PO), Take  by mouth., Disp: , Rfl:     MAGNESIUM PO, Take  by mouth. At bedtime for sleep, Disp: , Rfl:     metFORMIN (GLUCOPHAGE) 500 MG tablet, Take 1 tablet by mouth Daily., Disp: , Rfl:     multivitamin with minerals tablet tablet, Take 1 tablet by mouth Daily., Disp: , Rfl:     Omega-3 Fatty Acids (fish oil) 1000 MG capsule capsule, Take  by mouth Daily With Breakfast., Disp: , Rfl:     Repatha solution prefilled syringe injection, INJECT 1ML UNDER THE SKIN INTO THE APPROPRIATE AREA AS DIRECTED EVERY 14 DAYS, Disp: 1 mL, Rfl: 6    SEMAGLUTIDE, 2 MG/DOSE, SC, , Disp: , Rfl:     amLODIPine (NORVASC) 10 MG tablet, Take 1 tablet by mouth Daily., Disp: 90 tablet, Rfl: 3    clopidogrel (Plavix) 75 MG tablet, Take 1 tablet by mouth Daily., Disp: 90 tablet, Rfl: 3    Vital Signs:      /87 (BP Location: Right arm, Patient Position: Sitting, Cuff Size: Large Adult)   Pulse 83   Temp 98.2 °F (36.8 °C) (Infrared)   Resp 16   Ht 180.3 cm (71\")   Wt 117 kg (258 lb)   SpO2 98%   BMI 35.98 kg/m²     Vitals:    06/11/24 1351   BP: 134/87   BP Location: Right arm   Patient Position: Sitting   Cuff Size: Large Adult   Pulse: 83   Resp: 16   Temp: 98.2 °F (36.8 °C)   TempSrc: Infrared   SpO2: 98%   Weight: 117 kg (258 lb)   Height: 180.3 cm (71\")      Physical Exam  Vitals and nursing note reviewed.   Constitutional:       Appearance: Normal appearance. He is well-developed. He is obese. "   HENT:      Head: Normocephalic.      Right Ear: Tympanic membrane, ear canal and external ear normal.      Left Ear: Tympanic membrane, ear canal and external ear normal.      Nose: Nose normal.      Mouth/Throat:      Lips: Pink.      Mouth: Mucous membranes are moist.      Pharynx: Oropharynx is clear. Uvula midline.   Eyes:      General: Lids are normal. Vision grossly intact.      Conjunctiva/sclera: Conjunctivae normal.      Pupils: Pupils are equal, round, and reactive to light.   Neck:      Thyroid: No thyroid mass or thyromegaly.   Cardiovascular:      Rate and Rhythm: Regular rhythm.      Heart sounds: S1 normal and S2 normal. No murmur heard.     No friction rub. No gallop.   Pulmonary:      Effort: No respiratory distress.      Breath sounds: Normal breath sounds. No decreased breath sounds, wheezing or rales.   Chest:      Chest wall: No mass or tenderness.   Abdominal:      General: Bowel sounds are normal. There is no distension.      Palpations: Abdomen is soft.      Tenderness: There is no abdominal tenderness.      Hernia: No hernia is present.   Musculoskeletal:         General: Normal range of motion.      Cervical back: Normal range of motion and neck supple.   Lymphadenopathy:      Cervical: No cervical adenopathy.   Skin:     General: Skin is warm and dry.   Neurological:      General: No focal deficit present.      Mental Status: He is alert and oriented to person, place, and time.      Sensory: No sensory deficit.      Motor: Motor function is intact.      Coordination: Coordination is intact.      Gait: Gait is intact.      Deep Tendon Reflexes: Reflexes are normal and symmetric.   Psychiatric:         Mood and Affect: Mood normal. Mood is not anxious or depressed.         Speech: Speech normal.         Behavior: Behavior normal.         Thought Content: Thought content normal.         Judgment: Judgment normal.        Result Review :                  PHQ-9 Total Score: 0            Assessment and Plan    Diagnoses and all orders for this visit:    1. Annual physical exam (Primary)  Comments:  Anticipatory guidance was done.  Reviewed labs.  Follow-up 1 year    2. BMI 35.0-35.9,adult    3. Nocturia  -     PSA DIAGNOSTIC    4. Mixed hyperlipidemia  Assessment & Plan:    Following with cardiology      5. Primary hypertension  Assessment & Plan:  Hypertension is stable and controlled  Continue current treatment regimen.  Blood pressure will be reassessed in 6 months.      6. Morbidly obese  Assessment & Plan:  Patient's (Body mass index is 35.98 kg/m².) indicates that they are morbidly/severely obese (BMI > 40 or > 35 with obesity - related health condition) with health conditions that include hypertension and dyslipidemias . Weight is improving with treatment. BMI  is above average; BMI management plan is completed. We discussed portion control and increasing exercise.       7. Hepatic steatosis  Assessment & Plan:  Labs have greatly improved      8. Moderate episode of recurrent major depressive disorder  Assessment & Plan:  Patient's depression is a single episode that is mild without psychosis. Depression is in full remission and stable.    Plan:   Continue current medication therapy     Followup in 1 year.       Other orders  -     DULoxetine (CYMBALTA) 60 MG capsule; Take 1 capsule by mouth Daily. Increase dosage.  Dispense: 90 capsule; Refill: 2         Problem List Items Addressed This Visit          Active Problems    Depression    Current Assessment & Plan     Patient's depression is a single episode that is mild without psychosis. Depression is in full remission and stable.    Plan:   Continue current medication therapy     Followup in 1 year.          Relevant Medications    DULoxetine (CYMBALTA) 60 MG capsule    Hyperlipidemia    Current Assessment & Plan       Following with cardiology         Hypertension    Current Assessment & Plan     Hypertension is stable and  controlled  Continue current treatment regimen.  Blood pressure will be reassessed in 6 months.         Morbidly obese    Current Assessment & Plan     Patient's (Body mass index is 35.98 kg/m².) indicates that they are morbidly/severely obese (BMI > 40 or > 35 with obesity - related health condition) with health conditions that include hypertension and dyslipidemias . Weight is improving with treatment. BMI  is above average; BMI management plan is completed. We discussed portion control and increasing exercise.          BMI 35.0-35.9,adult    Hepatic steatosis    Current Assessment & Plan     Labs have greatly improved          Other Visit Diagnoses       Annual physical exam    -  Primary    Anticipatory guidance was done.  Reviewed labs.  Follow-up 1 year    Nocturia        Relevant Orders    PSA DIAGNOSTIC            Follow Up   Return in about 1 year (around 6/11/2025) for Annual physical.  Patient was given instructions and counseling regarding his condition or for health maintenance advice. Please see specific information pulled into the AVS if appropriate.

## 2024-06-11 NOTE — ASSESSMENT & PLAN NOTE
Patient's (Body mass index is 35.98 kg/m².) indicates that they are morbidly/severely obese (BMI > 40 or > 35 with obesity - related health condition) with health conditions that include hypertension and dyslipidemias . Weight is improving with treatment. BMI  is above average; BMI management plan is completed. We discussed portion control and increasing exercise.

## 2024-06-11 NOTE — ASSESSMENT & PLAN NOTE
Patient's depression is a single episode that is mild without psychosis. Depression is in full remission and stable.    Plan:   Continue current medication therapy     Followup in 1 year.

## 2024-06-12 LAB — PSA SERPL-MCNC: 1.5 NG/ML (ref 0–4)

## 2024-07-19 RX ORDER — AZILSARTAN KAMEDOXOMIL AND CHLORTHALIDONE 40; 25 MG/1; MG/1
1 TABLET ORAL DAILY
Qty: 90 TABLET | Refills: 2 | Status: SHIPPED | OUTPATIENT
Start: 2024-07-19

## 2024-08-19 ENCOUNTER — OFFICE (OUTPATIENT)
Dept: URBAN - METROPOLITAN AREA CLINIC 64 | Facility: CLINIC | Age: 49
End: 2024-08-19

## 2024-08-19 ENCOUNTER — OFFICE (OUTPATIENT)
Age: 49
End: 2024-08-19

## 2024-09-03 RX ORDER — EVOLOCUMAB 140 MG/ML
140 INJECTION, SOLUTION SUBCUTANEOUS
Qty: 6 ML | Refills: 5 | Status: SHIPPED | OUTPATIENT
Start: 2024-09-03

## 2024-12-11 RX ORDER — DULOXETIN HYDROCHLORIDE 60 MG/1
60 CAPSULE, DELAYED RELEASE ORAL DAILY
Qty: 30 CAPSULE | Refills: 0 | Status: SHIPPED | OUTPATIENT
Start: 2024-12-11

## 2024-12-11 NOTE — TELEPHONE ENCOUNTER
Patients mail order pharmacy is behind on getting his Cymbalta to him. He is requesting a short supply be sent to [facility] here in [place]. He has been out for a week now and can tell.

## 2025-01-21 RX ORDER — DULOXETIN HYDROCHLORIDE 60 MG/1
CAPSULE, DELAYED RELEASE ORAL
Qty: 90 CAPSULE | Refills: 1 | Status: SHIPPED | OUTPATIENT
Start: 2025-01-21

## 2025-03-14 RX ORDER — BUSPIRONE HYDROCHLORIDE 5 MG/1
5 TABLET ORAL 2 TIMES DAILY
Qty: 60 TABLET | Refills: 1 | Status: SHIPPED | OUTPATIENT
Start: 2025-03-14

## 2025-03-14 RX ORDER — HYDROXYZINE HYDROCHLORIDE 25 MG/1
25 TABLET, FILM COATED ORAL EVERY 8 HOURS PRN
Qty: 90 TABLET | Refills: 0 | Status: SHIPPED | OUTPATIENT
Start: 2025-03-14 | End: 2025-03-17 | Stop reason: SDUPTHER

## 2025-03-17 RX ORDER — HYDROXYZINE HYDROCHLORIDE 25 MG/1
25 TABLET, FILM COATED ORAL EVERY 8 HOURS PRN
Qty: 90 TABLET | Refills: 0 | Status: SHIPPED | OUTPATIENT
Start: 2025-03-17

## 2025-04-24 RX ORDER — EVOLOCUMAB 140 MG/ML
140 INJECTION, SOLUTION SUBCUTANEOUS
Qty: 6 ML | Refills: 5 | Status: SHIPPED | OUTPATIENT
Start: 2025-04-24

## 2025-04-24 RX ORDER — BUSPIRONE HYDROCHLORIDE 5 MG/1
5 TABLET ORAL 2 TIMES DAILY
Qty: 60 TABLET | Refills: 1 | Status: SHIPPED | OUTPATIENT
Start: 2025-04-24

## 2025-05-01 RX ORDER — EVOLOCUMAB 140 MG/ML
140 INJECTION, SOLUTION SUBCUTANEOUS
Qty: 2 ML | Refills: 0 | Status: SHIPPED | OUTPATIENT
Start: 2025-05-01

## 2025-05-16 RX ORDER — EVOLOCUMAB 140 MG/ML
140 INJECTION, SOLUTION SUBCUTANEOUS
Qty: 2 ML | Refills: 0 | Status: SHIPPED | OUTPATIENT
Start: 2025-05-16

## 2025-05-21 RX ORDER — DULOXETIN HYDROCHLORIDE 60 MG/1
60 CAPSULE, DELAYED RELEASE ORAL DAILY
Qty: 90 CAPSULE | Refills: 0 | Status: SHIPPED | OUTPATIENT
Start: 2025-05-21

## 2025-06-03 ENCOUNTER — TELEPHONE (OUTPATIENT)
Dept: FAMILY MEDICINE CLINIC | Facility: CLINIC | Age: 50
End: 2025-06-03
Payer: COMMERCIAL

## 2025-06-03 DIAGNOSIS — Z00.00 ANNUAL PHYSICAL EXAM: Primary | ICD-10-CM

## 2025-06-03 DIAGNOSIS — Z12.5 PROSTATE CANCER SCREENING: ICD-10-CM

## 2025-06-11 DIAGNOSIS — I10 ESSENTIAL HYPERTENSION: ICD-10-CM

## 2025-06-11 LAB
ALBUMIN SERPL-MCNC: 4.4 G/DL (ref 4.1–5.1)
ALP SERPL-CCNC: 47 IU/L (ref 44–121)
ALT SERPL-CCNC: 63 IU/L (ref 0–44)
AST SERPL-CCNC: 36 IU/L (ref 0–40)
BASOPHILS # BLD AUTO: 0.1 X10E3/UL (ref 0–0.2)
BASOPHILS NFR BLD AUTO: 2 %
BILIRUB SERPL-MCNC: 0.3 MG/DL (ref 0–1.2)
BUN SERPL-MCNC: 19 MG/DL (ref 6–24)
BUN/CREAT SERPL: 20 (ref 9–20)
CALCIUM SERPL-MCNC: 10 MG/DL (ref 8.7–10.2)
CHLORIDE SERPL-SCNC: 102 MMOL/L (ref 96–106)
CHOLEST SERPL-MCNC: 232 MG/DL (ref 100–199)
CO2 SERPL-SCNC: 25 MMOL/L (ref 20–29)
CREAT SERPL-MCNC: 0.96 MG/DL (ref 0.76–1.27)
EGFRCR SERPLBLD CKD-EPI 2021: 97 ML/MIN/1.73
EOSINOPHIL # BLD AUTO: 0.1 X10E3/UL (ref 0–0.4)
EOSINOPHIL NFR BLD AUTO: 2 %
ERYTHROCYTE [DISTWIDTH] IN BLOOD BY AUTOMATED COUNT: 15 % (ref 11.6–15.4)
GLOBULIN SER CALC-MCNC: 2.5 G/DL (ref 1.5–4.5)
GLUCOSE SERPL-MCNC: 92 MG/DL (ref 70–99)
HCT VFR BLD AUTO: 45.7 % (ref 37.5–51)
HDLC SERPL-MCNC: 50 MG/DL
HGB BLD-MCNC: 14.6 G/DL (ref 13–17.7)
IMM GRANULOCYTES # BLD AUTO: 0 X10E3/UL (ref 0–0.1)
IMM GRANULOCYTES NFR BLD AUTO: 0 %
LDLC SERPL CALC-MCNC: 162 MG/DL (ref 0–99)
LYMPHOCYTES # BLD AUTO: 2 X10E3/UL (ref 0.7–3.1)
LYMPHOCYTES NFR BLD AUTO: 34 %
MCH RBC QN AUTO: 27.4 PG (ref 26.6–33)
MCHC RBC AUTO-ENTMCNC: 31.9 G/DL (ref 31.5–35.7)
MCV RBC AUTO: 86 FL (ref 79–97)
MONOCYTES # BLD AUTO: 0.5 X10E3/UL (ref 0.1–0.9)
MONOCYTES NFR BLD AUTO: 9 %
NEUTROPHILS # BLD AUTO: 3.2 X10E3/UL (ref 1.4–7)
NEUTROPHILS NFR BLD AUTO: 53 %
PLATELET # BLD AUTO: 340 X10E3/UL (ref 150–450)
POTASSIUM SERPL-SCNC: 4.3 MMOL/L (ref 3.5–5.2)
PROT SERPL-MCNC: 6.9 G/DL (ref 6–8.5)
PSA SERPL-MCNC: 0.8 NG/ML (ref 0–4)
RBC # BLD AUTO: 5.32 X10E6/UL (ref 4.14–5.8)
SODIUM SERPL-SCNC: 142 MMOL/L (ref 134–144)
TRIGL SERPL-MCNC: 112 MG/DL (ref 0–149)
TSH SERPL DL<=0.005 MIU/L-ACNC: 0.7 UIU/ML (ref 0.45–4.5)
VLDLC SERPL CALC-MCNC: 20 MG/DL (ref 5–40)
WBC # BLD AUTO: 5.9 X10E3/UL (ref 3.4–10.8)

## 2025-06-11 RX ORDER — CLOPIDOGREL BISULFATE 75 MG/1
TABLET ORAL
Qty: 90 TABLET | Refills: 3 | Status: SHIPPED | OUTPATIENT
Start: 2025-06-11

## 2025-06-11 RX ORDER — AMLODIPINE BESYLATE 10 MG/1
10 TABLET ORAL DAILY
Qty: 90 TABLET | Refills: 3 | Status: SHIPPED | OUTPATIENT
Start: 2025-06-11

## 2025-06-16 ENCOUNTER — TELEPHONE (OUTPATIENT)
Dept: FAMILY MEDICINE CLINIC | Facility: CLINIC | Age: 50
End: 2025-06-16

## 2025-06-17 ENCOUNTER — OFFICE VISIT (OUTPATIENT)
Dept: FAMILY MEDICINE CLINIC | Facility: CLINIC | Age: 50
End: 2025-06-17
Payer: COMMERCIAL

## 2025-06-17 VITALS
BODY MASS INDEX: 36.4 KG/M2 | RESPIRATION RATE: 16 BRPM | DIASTOLIC BLOOD PRESSURE: 80 MMHG | SYSTOLIC BLOOD PRESSURE: 120 MMHG | HEART RATE: 90 BPM | TEMPERATURE: 97.5 F | HEIGHT: 71 IN | OXYGEN SATURATION: 96 % | WEIGHT: 260 LBS

## 2025-06-17 DIAGNOSIS — J30.1 SEASONAL ALLERGIC RHINITIS DUE TO POLLEN: ICD-10-CM

## 2025-06-17 DIAGNOSIS — I77.89 CORONARY ARTERY ECTASIA: ICD-10-CM

## 2025-06-17 DIAGNOSIS — K63.5 POLYP OF COLON, UNSPECIFIED PART OF COLON, UNSPECIFIED TYPE: ICD-10-CM

## 2025-06-17 DIAGNOSIS — Z00.00 ANNUAL PHYSICAL EXAM: ICD-10-CM

## 2025-06-17 DIAGNOSIS — F33.1 MODERATE EPISODE OF RECURRENT MAJOR DEPRESSIVE DISORDER: ICD-10-CM

## 2025-06-17 DIAGNOSIS — E66.01 MORBIDLY OBESE: ICD-10-CM

## 2025-06-17 DIAGNOSIS — K76.0 HEPATIC STEATOSIS: ICD-10-CM

## 2025-06-17 DIAGNOSIS — M79.641 PAIN OF RIGHT HAND: ICD-10-CM

## 2025-06-17 DIAGNOSIS — I10 PRIMARY HYPERTENSION: ICD-10-CM

## 2025-06-17 DIAGNOSIS — R20.2 PARESTHESIAS IN RIGHT HAND: ICD-10-CM

## 2025-06-17 DIAGNOSIS — R20.2 PARESTHESIA OF THUMB OF LEFT HAND: ICD-10-CM

## 2025-06-17 NOTE — PROGRESS NOTES
Chief Complaint  Annual Exam, Arm Pain, and Numbness    Subjective          Kemal Dumont presents to [facility] for annual exam, arm and hand numbness and stress and anxiety.    History of Present Illness    Patient is here for annual exam.  Previously was stable on Repatha and has been off of it for a few weeks.  Sees cardiology in follow-up soon.  Discussed.     Mild elevation in ALT, will discuss     LDL up to 162     TSH and CBC were normal.  PSA is in a normal range as well    He has had some stress in his life and had some as needed anxiety medication he is no longer taking.  He does take his Cymbalta.  He has been on semaglutide prescription from another provider.    Has a new problem of numbness and tingling in his hands in the morning mostly.  He knows he sleeps on his hands.  He uses hands a lot.  He does notice numbness when he is lying in bed and holding something over his head to read.  Has no neck pain or concerns.        Kemal Dumont  has a past medical history of Allergic (childhood), Allergic rhinitis, Anserine bursitis (l), Depression, History of tobacco use, Hyperlipidemia, Hypertension, Left shoulder strain, and VT (ventricular tachycardia) (01/13/2020).      Review of Systems   Constitutional:  Negative for fatigue.   HENT:  Negative for ear pain, sinus pain and sore throat.    Eyes:  Negative for pain.   Respiratory:  Negative for cough and shortness of breath.    Cardiovascular:  Negative for chest pain.   Gastrointestinal:  Negative for abdominal pain, diarrhea and nausea.   Endocrine: Negative for polyuria.   Genitourinary:  Negative for decreased urine volume and dysuria.   Musculoskeletal:  Negative for arthralgias.   Skin:  Negative for rash.   Allergic/Immunologic: Negative for environmental allergies.   Neurological:  Positive for numbness. Negative for dizziness and headaches.   Hematological:  Does not bruise/bleed easily.  "  Psychiatric/Behavioral:  The patient is nervous/anxious.         Objective       Current Outpatient Medications:     amLODIPine (NORVASC) 10 MG tablet, TAKE 1 TABLET DAILY, Disp: 90 tablet, Rfl: 3    aspirin (aspirin) 81 MG EC tablet, Take 1 tablet by mouth Daily., Disp: 90 tablet, Rfl: 3    clopidogrel (PLAVIX) 75 MG tablet, TAKE 1 TABLET DAILY (YOU MUST SCHEDULE AN APPOINTMENT FOR REFILLS), Disp: 90 tablet, Rfl: 3    coenzyme Q10 100 MG capsule, Take 1 capsule by mouth Daily., Disp: , Rfl:     DULoxetine (CYMBALTA) 60 MG capsule, Take 1 capsule by mouth Daily., Disp: 90 capsule, Rfl: 3    Edarbyclor 40-25 MG tablet, TAKE 1 TABLET BY MOUTH ONCE DAILY, Disp: 90 tablet, Rfl: 2    Evolocumab (Repatha) solution prefilled syringe injection, Inject 1 mL under the skin into the appropriate area as directed Every 14 (Fourteen) Days., Disp: 2 mL, Rfl: 0    Lactobacillus (PROBIOTIC ACIDOPHILUS PO), Take  by mouth., Disp: , Rfl:     MAGNESIUM PO, Take  by mouth. At bedtime for sleep, Disp: , Rfl:     metFORMIN (GLUCOPHAGE) 500 MG tablet, Take 1 tablet by mouth Daily., Disp: , Rfl:     multivitamin with minerals tablet tablet, Take 1 tablet by mouth Daily., Disp: , Rfl:     Omega-3 Fatty Acids (fish oil) 1000 MG capsule capsule, Take  by mouth Daily With Breakfast., Disp: , Rfl:     SEMAGLUTIDE, 2 MG/DOSE, SC, , Disp: , Rfl:     Vital Signs:      /80 (BP Location: Right arm, Patient Position: Sitting, Cuff Size: Large Adult)   Pulse 90   Temp 97.5 °F (36.4 °C) (Infrared)   Resp 16   Ht 180.3 cm (71\")   Wt 118 kg (260 lb)   SpO2 96%   BMI 36.26 kg/m²     Vitals:    06/17/25 1511   BP: 120/80   BP Location: Right arm   Patient Position: Sitting   Cuff Size: Large Adult   Pulse: 90   Resp: 16   Temp: 97.5 °F (36.4 °C)   TempSrc: Infrared   SpO2: 96%   Weight: 118 kg (260 lb)   Height: 180.3 cm (71\")      Physical Exam  Vitals and nursing note reviewed.   Constitutional:       Appearance: Normal appearance. He is " obese.   HENT:      Head: Normocephalic.      Right Ear: Tympanic membrane, ear canal and external ear normal.      Left Ear: Tympanic membrane, ear canal and external ear normal.      Nose: Nose normal.      Mouth/Throat:      Mouth: Mucous membranes are moist.      Pharynx: No oropharyngeal exudate.   Eyes:      Extraocular Movements: Extraocular movements intact.      Conjunctiva/sclera: Conjunctivae normal.      Pupils: Pupils are equal, round, and reactive to light.   Cardiovascular:      Rate and Rhythm: Normal rate.      Heart sounds: No murmur heard.     No friction rub. No gallop.   Pulmonary:      Effort: Pulmonary effort is normal.      Breath sounds: Normal breath sounds. No wheezing.   Chest:      Chest wall: No tenderness.   Abdominal:      General: Bowel sounds are normal. There is no distension.      Palpations: Abdomen is soft.      Tenderness: There is no abdominal tenderness.      Hernia: No hernia is present.   Musculoskeletal:         General: Normal range of motion.      Cervical back: Normal range of motion and neck supple.      Comments: Tinel and phalanx are negative.  Patient does have tenderness at the proximal end of the first metacarpal joint.  No atrophy.  Neck full range of motion nontender   Skin:     General: Skin is warm and dry.      Findings: No lesion or rash.   Neurological:      General: No focal deficit present.      Mental Status: He is alert.      Cranial Nerves: No cranial nerve deficit.      Motor: No weakness.      Coordination: Coordination normal.      Gait: Gait normal.      Deep Tendon Reflexes: Reflexes normal.   Psychiatric:         Mood and Affect: Mood normal.         Behavior: Behavior normal.         Thought Content: Thought content normal.         Judgment: Judgment normal.        Result Review :     CMP          6/10/2025    11:17   CMP   Glucose 92    BUN 19    Creatinine 0.96    EGFR 97    Sodium 142    Potassium 4.3    Chloride 102    Calcium 10.0    Total  Protein 6.9    Albumin 4.4    Globulin 2.5    Total Bilirubin 0.3    Alkaline Phosphatase 47    AST (SGOT) 36    ALT (SGPT) 63    BUN/Creatinine Ratio 20      CBC w/diff          6/10/2025    11:17   CBC w/Diff   WBC 5.9    RBC 5.32    Hemoglobin 14.6    Hematocrit 45.7    MCV 86    MCH 27.4    MCHC 31.9    RDW 15.0    Platelets 340    Neutrophil Rel % 53    Lymphocyte Rel % 34    Monocyte Rel % 9    Eosinophil Rel % 2    Basophil Rel % 2      Lipid Panel          6/10/2025    11:17   Lipid Panel   Total Cholesterol 232    Triglycerides 112    HDL Cholesterol 50    VLDL Cholesterol 20    LDL Cholesterol  162      TSH          6/10/2025    11:17   TSH   TSH 0.703                   Little interest or pleasure in doing things? Not at all   Feeling down, depressed, or hopeless? Not at all   PHQ-2 Total Score 0   Trouble falling or staying asleep, or sleeping too much? Not at all   Feeling tired or having little energy? Not at all   Poor appetite or overeating? Not at all   Feeling bad about yourself - or that you are a failure or have let yourself or your family down? Not at all   Trouble concentrating on things, such as reading the newspaper or watching television? Not at all   Moving or speaking so slowly that other people could have noticed? Or the opposite - being so fidgety or restless that you have been moving around a lot more than usual? Not at all     Thoughts that you would be better off dead, or of hurting yourself in some way? Not at all   PHQ-9 Total Score 0   If you checked off any problems, how difficult have these problems made it for you to do your work, take care of things at home, or get along with other people? Not difficult at all                 Assessment and Plan    Diagnoses and all orders for this visit:    1. Body mass index (BMI) of 36.0 to 36.9 in adult (Primary)    2. Seasonal allergic rhinitis due to pollen  Assessment & Plan:  Counter medication as directed      3. Coronary artery  ectasia    4. Primary hypertension  Assessment & Plan:  Hypertension is stable and controlled  Continue current treatment regimen.  Blood pressure will be reassessed in 6 months.      5. Morbidly obese  Assessment & Plan:  Patient's (Body mass index is 36.26 kg/m².) indicates that they are morbidly/severely obese (BMI > 40 or > 35 with obesity - related health condition) with health conditions that include hypertension and dyslipidemias . Weight is improving with treatment. BMI  is above average; BMI management plan is completed. We discussed portion control and increasing exercise.       6. Polyp of colon, unspecified part of colon, unspecified type  Assessment & Plan:  Repeat colonoscopy due in September 2026      7. Hepatic steatosis    8. Moderate episode of recurrent major depressive disorder  Assessment & Plan:  Patient's depression is a single episode that is mild without psychosis. Depression is in full remission and stable.    Plan:   Continue current medication therapy     Followup in 1 year.       9. Annual physical exam    10. Paresthesias in right hand  Comments:  Discussed splints, vitamin B6.  Will do nerve conduction hand referral if not better    11. Paresthesia of thumb of left hand    12. Pain of right hand  Comments:  At first metacarpal joint, likely arthritic.  Voltaren gel.  Follow-up if not better    Other orders  -     DULoxetine (CYMBALTA) 60 MG capsule; Take 1 capsule by mouth Daily.  Dispense: 90 capsule; Refill: 3         Problem List Items Addressed This Visit          Active Problems    Allergic rhinitis    Current Assessment & Plan   Counter medication as directed         Depression    Current Assessment & Plan   Patient's depression is a single episode that is mild without psychosis. Depression is in full remission and stable.    Plan:   Continue current medication therapy     Followup in 1 year.          Relevant Medications    DULoxetine (CYMBALTA) 60 MG capsule    Hypertension     Current Assessment & Plan   Hypertension is stable and controlled  Continue current treatment regimen.  Blood pressure will be reassessed in 6 months.         Morbidly obese    Current Assessment & Plan   Patient's (Body mass index is 36.26 kg/m².) indicates that they are morbidly/severely obese (BMI > 40 or > 35 with obesity - related health condition) with health conditions that include hypertension and dyslipidemias . Weight is improving with treatment. BMI  is above average; BMI management plan is completed. We discussed portion control and increasing exercise.          Coronary artery ectasia    Body mass index (BMI) of 36.0 to 36.9 in adult - Primary    Hepatic steatosis    Colon polyps    Current Assessment & Plan   Repeat colonoscopy due in September 2026          Other Visit Diagnoses         Annual physical exam          Paresthesias in right hand        Discussed splints, vitamin B6.  Will do nerve conduction hand referral if not better      Paresthesia of thumb of left hand          Pain of right hand        At first metacarpal joint, likely arthritic.  Voltaren gel.  Follow-up if not better            Follow Up   Return in about 1 year (around 6/17/2026) for Annual physical.  Patient was given instructions and counseling regarding his condition or for health maintenance advice. Please see specific information pulled into the AVS if appropriate.

## 2025-06-18 RX ORDER — DULOXETIN HYDROCHLORIDE 60 MG/1
60 CAPSULE, DELAYED RELEASE ORAL DAILY
Qty: 90 CAPSULE | Refills: 3 | Status: SHIPPED | OUTPATIENT
Start: 2025-06-18

## 2025-06-18 NOTE — ASSESSMENT & PLAN NOTE
Patient's (Body mass index is 36.26 kg/m².) indicates that they are morbidly/severely obese (BMI > 40 or > 35 with obesity - related health condition) with health conditions that include hypertension and dyslipidemias . Weight is improving with treatment. BMI  is above average; BMI management plan is completed. We discussed portion control and increasing exercise.

## 2025-06-28 DIAGNOSIS — R20.2 PARESTHESIAS IN RIGHT HAND: Primary | ICD-10-CM

## 2025-06-28 DIAGNOSIS — R20.2 PARESTHESIA OF THUMB OF LEFT HAND: ICD-10-CM

## 2025-06-28 DIAGNOSIS — M79.641 PAIN OF RIGHT HAND: ICD-10-CM

## 2025-07-01 RX ORDER — AZILSARTAN KAMEDOXOMIL AND CHLORTHALIDONE 40; 25 MG/1; MG/1
1 TABLET ORAL DAILY
Qty: 90 TABLET | Refills: 2 | Status: SHIPPED | OUTPATIENT
Start: 2025-07-01

## 2025-07-09 ENCOUNTER — TELEPHONE (OUTPATIENT)
Dept: FAMILY MEDICINE CLINIC | Facility: CLINIC | Age: 50
End: 2025-07-09
Payer: COMMERCIAL

## 2025-07-10 ENCOUNTER — HOSPITAL ENCOUNTER (OUTPATIENT)
Dept: GENERAL RADIOLOGY | Facility: HOSPITAL | Age: 50
Discharge: HOME | End: 2025-07-10
Admitting: NURSE PRACTITIONER
Payer: COMMERCIAL

## 2025-07-10 DIAGNOSIS — R20.2 PARESTHESIA OF THUMB OF LEFT HAND: ICD-10-CM

## 2025-07-10 DIAGNOSIS — M79.641 PAIN OF RIGHT HAND: ICD-10-CM

## 2025-07-10 DIAGNOSIS — R20.2 PARESTHESIAS IN RIGHT HAND: ICD-10-CM

## 2025-07-10 PROCEDURE — 73130 X-RAY EXAM OF HAND: CPT

## 2025-07-11 ENCOUNTER — TELEPHONE (OUTPATIENT)
Dept: FAMILY MEDICINE CLINIC | Facility: CLINIC | Age: 50
End: 2025-07-11
Payer: COMMERCIAL

## 2025-07-11 DIAGNOSIS — M79.641 PAIN OF RIGHT HAND: ICD-10-CM

## 2025-07-11 DIAGNOSIS — R20.2 PARESTHESIA OF THUMB OF LEFT HAND: ICD-10-CM

## 2025-07-11 DIAGNOSIS — R20.2 PARESTHESIAS IN RIGHT HAND: Primary | ICD-10-CM

## 2025-07-11 NOTE — TELEPHONE ENCOUNTER
Please call patient and let him know, that I thought I might try to go ahead and refer him directly to the hand surgeon.  Sometimes the hand surgery specialist will have a smaller less complex nerve conduction study.  I am not sure if you run into the insurance issue, with this referral.  I do not want him to cancel the other appointment for nerve conduction.  That is a full-scale nerve conduction test and we still may need it.  Ask him if he wants to try this approach?  The referral is already in if he does.  If not please cancel it.

## 2025-07-14 NOTE — PROGRESS NOTES
Cardiology Office Visit      Encounter Date:  07/15/2025    Patient ID:   Kemal Dumont is a 49 y.o. male.    Reason For Followup:  Coronary ectasia    Brief Clinical History:  Dear Dr. Ellis, CARIDAD Gilliam    I had the pleasure of seeing Kemal Dumont today. As you are well aware, this is a 49 y.o. male with no established history of ischemic heart disease.  He does have a history of coronary ectasia.  He underwent cardiac catheterization in January 2020 secondary to an abnormal treadmill with nonsustained ventricular tachycardia.  He was found to have significant coronary ectasia involving the RCA, LAD, and circumflex vessels.  He has additional history that includes hypertension, hyperlipidemia, and morbid obesity.  He presents today for follow-up on the above conditions.    Interval History:  He denies any chest pain pressure heaviness or tightness.  He denies any shortness of breath out of character.  He denies any PND orthopnea.  He denies any syncope or near syncope.  He reports feeling well from a cardiac perspective.    His blood pressures demonstrated remarkable improvement.  Although his blood pressure is elevated today in the office his home readings have been below 140/90 predominantly.    If you will recall, the patient underwent a cardiac catheterization in January 2020.  This was secondary to nonsustained ventricular tachycardia revealed on stress testing.  No angiographic occlusive disease was noted however he did have significant coronary ectasia which prompted the utilization of antiplatelet therapy.    He has started Repatha for lipid management.  We will check his lipids at his next visit.  Statins were not an option due to a history of abnormal LFTs while taking statins.    06/05/2024        He reports that he started taking Ozempic and he feels better and his liver numbers are very good. He has lost 50#. Reviewed labs on phone but will get hard copies. [employer] dropped all group  "coverage for him and his office was left with huge insurance problem. He is on caresource now but will get a new offier. Blood pressure is elevatefd today but was 110 systolic a week ago.  Because of this we will hold off on making any adjustments to his antihypertensives.  It is likely that his blood pressures will continue to decline as his weight comes down.    He reports he has been out of Repatha.  He was given a coupon today for assistance.    07/15/2025        Patient reports doing well. No chest pain. No shortness of breath. Reviewed labs and his repatha has been difficult to get. We will send to [facility] to fill. He is continuing to lose weight.     We utilize Repatha because he has had issues with elevated liver enzymes while on statins in the past.     Assessment & Plan    Impressions:  Severe coronary ectasia  Nonsustained ventricular tachycardia on treadmill stress test with no evidence of occlusive disease on catheterization January 2020  Antiplatelet therapy  Hyperlipidemia     Statin intolerant secondary to abnormal LFTs  Hypertension  Adverse reaction to ACE inhibitors-cough    Recommendations:  Continuation of his current cardiovascular regimen at the present time.     This includes antihypertensives, antiplatelet therapy, and Repatha  Follow-up in 1 years time sooner should there be difficulties    Diagnoses and all orders for this visit:    1. Coronary artery ectasia (Primary)  -     ECG 12 Lead    2. Mixed hyperlipidemia  -     ECG 12 Lead    3. Primary hypertension  -     ECG 12 Lead    Other orders  -     Evolocumab (Repatha) solution prefilled syringe injection; Inject 1 mL under the skin into the appropriate area as directed Every 14 (Fourteen) Days.  Dispense: 6 mL; Refill: 3              Objective:    Vitals:  Vitals:    07/15/25 1301   BP: 116/80   Pulse: 77   SpO2: 98%   Weight: 117 kg (258 lb)   Height: 180.3 cm (71\")         Body mass index is 35.98 kg/m².      Physical " Exam:    General: Alert, cooperative, no distress, appears stated age  Head:  Normocephalic, atraumatic, mucous membranes moist  Eyes:  Conjunctiva/corneas clear, EOM's intact     Neck:  Supple,  no bruit    Lungs: Clear to auscultation bilaterally, no wheezes rhonchi rales are noted  Chest wall: No tenderness  Heart::  Regular rate and rhythm, S1 and S2 normal, 1/6 holosystolic murmur.  No rub or gallop  Abdomen: Soft, non-tender, nondistended bowel sounds active.  Obese  Extremities: No cyanosis, clubbing, or edema  Pulses: 2+ and symmetric all extremities  Skin:  No rashes or lesions  Neuro/psych: A&O x3. CN II through XII are grossly intact with appropriate affect      Allergies:  Allergies   Allergen Reactions    Ace Inhibitors Cough    Valsartan Cough    Statins Other (See Comments)     Increased LFT       Medication Review:     Current Outpatient Medications:     amLODIPine (NORVASC) 10 MG tablet, TAKE 1 TABLET DAILY, Disp: 90 tablet, Rfl: 3    aspirin (aspirin) 81 MG EC tablet, Take 1 tablet by mouth Daily., Disp: 90 tablet, Rfl: 3    clopidogrel (PLAVIX) 75 MG tablet, TAKE 1 TABLET DAILY (YOU MUST SCHEDULE AN APPOINTMENT FOR REFILLS), Disp: 90 tablet, Rfl: 3    coenzyme Q10 100 MG capsule, Take 1 capsule by mouth Daily., Disp: , Rfl:     DULoxetine (CYMBALTA) 60 MG capsule, Take 1 capsule by mouth Daily., Disp: 90 capsule, Rfl: 3    Edarbyclor 40-25 MG tablet, TAKE 1 TABLET BY MOUTH ONCE DAILY, Disp: 90 tablet, Rfl: 2    Evolocumab (Repatha) solution prefilled syringe injection, Inject 1 mL under the skin into the appropriate area as directed Every 14 (Fourteen) Days., Disp: 6 mL, Rfl: 3    Lactobacillus (PROBIOTIC ACIDOPHILUS PO), Take  by mouth., Disp: , Rfl:     MAGNESIUM PO, Take  by mouth. At bedtime for sleep, Disp: , Rfl:     metFORMIN (GLUCOPHAGE) 500 MG tablet, Take 1 tablet by mouth Daily., Disp: , Rfl:     multivitamin with minerals tablet tablet, Take 1 tablet by mouth Daily., Disp: , Rfl:      Omega-3 Fatty Acids (fish oil) 1000 MG capsule capsule, Take  by mouth Daily With Breakfast., Disp: , Rfl:     SEMAGLUTIDE, 2 MG/DOSE, SC, , Disp: , Rfl:     Family History:  Family History   Problem Relation Age of Onset    Diabetes Father     Hypertension Father     Hyperlipidemia Father     Other Father         lung/respiratory disease     Stroke Father     Colon polyps Father     Depression Father     Diabetes Maternal Grandmother     Anxiety disorder Maternal Grandmother     Asthma Maternal Grandmother     COPD Maternal Grandmother     Depression Maternal Grandmother     Mental illness Maternal Grandmother     Lung cancer Maternal Grandfather     Liver cancer Maternal Grandfather     Alcohol abuse Maternal Grandfather     Cancer Maternal Grandfather     Liver disease Maternal Grandfather     Heart disease Paternal Grandfather     Cancer Paternal Grandfather     Colon cancer Maternal Uncle        Past Medical History:  Past Medical History:   Diagnosis Date    Allergic childhood    Allergic rhinitis     Anserine bursitis l    left    Colon polyp 2000    All removed    Depression     History of tobacco use     Hyperlipidemia     Hypertension     Left shoulder strain     VT (ventricular tachycardia) 01/13/2020    Added automatically from request for surgery [id number]       Past Surgical History:  Past Surgical History:   Procedure Laterality Date    ANKLE ARTHROSCOPY Right 11/2008    CARDIAC CATHETERIZATION N/A 01/14/2020    Procedure: Left Heart Cath;  Surgeon: Micheal Mejias DO;  Location: [facility] CATH INVASIVE LOCATION;  Service: Cardiology    COLONOSCOPY      TONSILLECTOMY      VASECTOMY         Social History:  Social History     Socioeconomic History    Marital status:    Tobacco Use    Smoking status: Never     Passive exposure: Never    Smokeless tobacco: Never    Tobacco comments:     socially while in high school    Vaping Use    Vaping status: Never Used   Substance and Sexual Activity     Alcohol use: Yes     Alcohol/week: 14.0 standard drinks of alcohol     Types: 8 Glasses of wine, 6 Drinks containing 0.5 oz of alcohol per week     Comment: 3 x wk / [place], wine, occasional beer     Drug use: Never     Types: Marijuana    Sexual activity: Yes     Partners: Female     Birth control/protection: Vasectomy, Surgical       Review of Systems:  The following systems were reviewed as they relate to the cardiovascular system: Constitutional, Eyes, ENT, Cardiovascular, Respiratory, Gastrointestinal, Integumentary, Neurological, Psychiatric, Hematologic, Endocrine, Musculoskeletal, and Genitourinary. The pertinent cardiovascular findings are reported above with all other cardiovascular points within those systems being negative.    Diagnostic Study Review:     Current Electrocardiogram:    ECG 12 Lead    Date/Time: 7/15/2025 5:17 PM  Performed by: Micheal Mejias DO    Authorized by: Micheal Mejias DO  Comparison: not compared with previous ECG   Previous ECG: no previous ECG available  Comments: Normal sinus rhythm with a ventricular rate of 77 bpm.  Borderline right axis deviation.  Repolarization abnormalities consider ischemia-similar to previous tracing.  Normal QT and QTc intervals.          Laboratory Data:  Lab Results   Component Value Date    GLUCOSE 92 06/10/2025    BUN 19 06/10/2025    CREATININE 0.96 06/10/2025    EGFRIFNONA 93 08/10/2021    EGFRIFAFRI 107 08/10/2021    BCR 20 06/10/2025    K 4.3 06/10/2025    CO2 25 06/10/2025    CALCIUM 10.0 06/10/2025    ALBUMIN 4.4 06/10/2025    AST 36 06/10/2025    ALT 63 (H) 06/10/2025     Lab Results   Component Value Date    GLUCOSE 92 06/10/2025    CALCIUM 10.0 06/10/2025     06/10/2025    K 4.3 06/10/2025    CO2 25 06/10/2025     06/10/2025    BUN 19 06/10/2025    CREATININE 0.96 06/10/2025    EGFRIFAFRI 107 08/10/2021    EGFRIFNONA 93 08/10/2021    BCR 20 06/10/2025    ANIONGAP 9.0 01/14/2020     Lab Results  "  Component Value Date    WBC 5.9 06/10/2025    HGB 14.6 06/10/2025    HCT 45.7 06/10/2025    MCV 86 06/10/2025     06/10/2025     Lab Results   Component Value Date    CHOL 170 05/08/2019    CHLPL 232 (H) 06/10/2025    TRIG 112 06/10/2025    HDL 50 06/10/2025     (H) 06/10/2025     Lab Results   Component Value Date    HGBA1C 5.5 04/04/2023     No results found for: \"INR\", \"PROTIME\"    Most Recent Echo:       Most Recent Stress Test:  Results for orders placed during the hospital encounter of 01/13/20    Stress Test With Myocardial Perfusion One Day 01/14/2020  1:12 PM, 01/14/2020  5:20 PM    Interpretation Summary  · Findings consistent with an abnormal ECG stress test secondary to nonsustained ventricular tachycardia occurring at peak exercise  · No scintigraphic evidence for provokable ischemia  · Fixed inferobasilar defect likely secondary to technical factors  · Left ventricular ejection fraction is normal (Calculated EF = 58%).  · Impressions are consistent with a low risk study.  · There is no prior study available for comparison.  · Cardiac catheterization recommended secondary to the presence of nonsustained ventricular tachycardia    Stress portion of this exam was supervised by: Najma Rivera NP       Most Recent Cardiac Catheterization:   Results for orders placed during the hospital encounter of 01/13/20    Cardiac Catheterization/Vascular Study    Narrative  Cardiac Catheterization Operative Report    Kemal Dumont  [id number]  1/14/2020  @PCP@    He underwent left heart catheterization with selective coronary angiography, left ventriculography, right common iliac angiography.    Indications for the procedure include: abnormal stress test.    Procedure Details:  The risks, benefits, complications, treatment options, and expected outcomes were discussed with the patient. The patient and/or family concurred with the proposed plan, giving informed consent.    After informed consent " the patient was brought to the cath lab after appropriate IV hydration was begun and oral premedication was given. He was further sedated with fentanyl and midazolam. He was prepped and draped in the usual manner. Using the modified Seldinger access technique, a 6 [place] sheath was placed in the femoral artery. A left heart catheterization with coronary arteriography was performed. Findings are discussed below.    After the procedure was completed, sedation was stopped and the sheaths and catheters were all removed. Hemostasis was achieved per established hospital protocols.    Findings:    Hemodynamics  LVEDP: 6  LV: 100/0  AO: 100/70  Left Main  very large, no significant disease  RCA  very large, ectatic, no occlusive disease  LAD  very large, ectatic no occlusive disease  Circ  very large, ectatic no occlusive disease  SVG(s)  not applicable  ANTONIO  not applicable  LV  normal LV systolic function EF 55%  Coronary Dominance  RCA    Estimated Blood Loss:  Minimal    Complications:  None; patient tolerated the procedure well.    Disposition: PACU - hemodynamically stable    Condition: stable    Impressions:  No angiographic evidence for significant epicardial occlusive disease  Very large and ectatic coronary arteries with sluggish flow  Normal left ventricular systolic function ejection fraction 55%    Recommendations:  Add Plavix to daily aspirin  Aggressively treat risk factors  Cardiac status otherwise appropriate for discharge after bedrest.  Follow-up 2 weeks       NOTE:     Today,07/15/2025 ,the following portions of the patient's note were reviewed, confirmed and/or updated as appropriate: History of present illness/Interval history, physical examination, assessment & plan, allergies, current medications, past family history, past medical history, past social history, past surgical history and problem list.    Labs pertinent to today's visit on 07/15/2025 (including but not limited to CBC, CMP, and lipid  "profiles) were requested from the patient's primary care provider/hospital/clinical laboratory.  If the labs were available for the visit, they were reviewed with the patient.  If they were not available, when received, special interest will be made to the labs pertinent to this visit.  The patient's most recent \"in-house\" labs are noted below and have been reviewed.  Outside labs pertinent to this visit are scanned into the record and have been reviewed.    Discussions held today, 07/15/2025,regarding procedures included risk, benefits, and options including but not limited to: Death, MI, stroke, pain, bleeding, infection, and possible need for vascular/thoracic/cardiothoracic surgery.    Copied information within this note was reviewed and is current as of 07/15/2025.    Assessment and plan noted herein represents the current plan of care as of 07/15/2025.    Significant resources from our office and staff are inherent in engaging this patient today,07/15/2025,and in a continuous and active collaborative plan of care related to their chronic cardiovascular conditions outlined herein.  The management of these conditions requires the direction of our service with specialized clinical knowledge, skills, and experience.  This collaborative care includes but is not limited to patient education, expectations and responsibilities, shared decision making around therapeutic goals, and shared commitments to achieve those goals.  "

## 2025-07-15 ENCOUNTER — OFFICE VISIT (OUTPATIENT)
Dept: CARDIOLOGY | Facility: CLINIC | Age: 50
End: 2025-07-15
Payer: COMMERCIAL

## 2025-07-15 VITALS
OXYGEN SATURATION: 98 % | BODY MASS INDEX: 36.12 KG/M2 | SYSTOLIC BLOOD PRESSURE: 116 MMHG | DIASTOLIC BLOOD PRESSURE: 80 MMHG | WEIGHT: 258 LBS | HEIGHT: 71 IN | HEART RATE: 77 BPM

## 2025-07-15 DIAGNOSIS — E78.2 MIXED HYPERLIPIDEMIA: ICD-10-CM

## 2025-07-15 DIAGNOSIS — I77.89 CORONARY ARTERY ECTASIA: Primary | ICD-10-CM

## 2025-07-15 DIAGNOSIS — I10 PRIMARY HYPERTENSION: ICD-10-CM

## 2025-07-15 PROCEDURE — 99214 OFFICE O/P EST MOD 30 MIN: CPT | Performed by: INTERNAL MEDICINE

## 2025-07-15 PROCEDURE — 93000 ELECTROCARDIOGRAM COMPLETE: CPT | Performed by: INTERNAL MEDICINE

## 2025-07-15 RX ORDER — EVOLOCUMAB 140 MG/ML
140 INJECTION, SOLUTION SUBCUTANEOUS
Qty: 6 ML | Refills: 3 | Status: SHIPPED | OUTPATIENT
Start: 2025-07-15

## 2025-08-22 DIAGNOSIS — I10 ESSENTIAL HYPERTENSION: ICD-10-CM

## 2025-08-22 RX ORDER — AMLODIPINE BESYLATE 10 MG/1
10 TABLET ORAL DAILY
Qty: 90 TABLET | Refills: 3 | Status: SHIPPED | OUTPATIENT
Start: 2025-08-22

## 2025-08-22 RX ORDER — CLOPIDOGREL BISULFATE 75 MG/1
75 TABLET ORAL DAILY
Qty: 90 TABLET | Refills: 3 | Status: SHIPPED | OUTPATIENT
Start: 2025-08-22

## 2025-08-22 RX ORDER — DULOXETIN HYDROCHLORIDE 60 MG/1
60 CAPSULE, DELAYED RELEASE ORAL DAILY
Qty: 90 CAPSULE | Refills: 3 | Status: SHIPPED | OUTPATIENT
Start: 2025-08-22

## (undated) DEVICE — CATH MPAC STP 6F: Brand: SUPER TORQUE

## (undated) DEVICE — RADIFOCUS OBTURATOR: Brand: RADIFOCUS

## (undated) DEVICE — PK TRY HEART CATH 50

## (undated) DEVICE — GW PTFE EMERALD HEPCOAT FC J TIP STD .035 3MM 150CM

## (undated) DEVICE — PINNACLE INTRODUCER SHEATH: Brand: PINNACLE